# Patient Record
Sex: FEMALE | Race: WHITE | Employment: FULL TIME | URBAN - METROPOLITAN AREA
[De-identification: names, ages, dates, MRNs, and addresses within clinical notes are randomized per-mention and may not be internally consistent; named-entity substitution may affect disease eponyms.]

---

## 2017-05-04 ENCOUNTER — TELEPHONE (OUTPATIENT)
Dept: PEDIATRICS | Facility: CLINIC | Age: 22
End: 2017-05-04

## 2017-05-04 NOTE — TELEPHONE ENCOUNTER
Patient is out of [place] requesting the immunization records be fax to [phone] this is needed for school  Thank you

## 2017-07-05 ENCOUNTER — OFFICE VISIT (OUTPATIENT)
Dept: PEDIATRICS | Facility: CLINIC | Age: 22
End: 2017-07-05
Payer: COMMERCIAL

## 2017-07-05 VITALS
HEIGHT: 62 IN | DIASTOLIC BLOOD PRESSURE: 73 MMHG | SYSTOLIC BLOOD PRESSURE: 104 MMHG | OXYGEN SATURATION: 98 % | WEIGHT: 116 LBS | HEART RATE: 75 BPM | TEMPERATURE: 97.3 F | BODY MASS INDEX: 21.35 KG/M2

## 2017-07-05 DIAGNOSIS — N89.8 VAGINAL ODOR: ICD-10-CM

## 2017-07-05 DIAGNOSIS — N76.0 BACTERIAL VAGINOSIS: ICD-10-CM

## 2017-07-05 DIAGNOSIS — B96.89 BACTERIAL VAGINOSIS: ICD-10-CM

## 2017-07-05 DIAGNOSIS — R07.0 THROAT PAIN: Primary | ICD-10-CM

## 2017-07-05 DIAGNOSIS — J03.90 EXUDATIVE TONSILLITIS: ICD-10-CM

## 2017-07-05 LAB
MICRO REPORT STATUS: ABNORMAL
SPECIMEN SOURCE: ABNORMAL
WET PREP SPEC: ABNORMAL

## 2017-07-05 PROCEDURE — 99213 OFFICE O/P EST LOW 20 MIN: CPT | Performed by: NURSE PRACTITIONER

## 2017-07-05 PROCEDURE — 87210 SMEAR WET MOUNT SALINE/INK: CPT | Performed by: NURSE PRACTITIONER

## 2017-07-05 RX ORDER — CLINDAMYCIN HCL 150 MG
150 CAPSULE ORAL 3 TIMES DAILY
Qty: 30 CAPSULE | Refills: 0 | Status: SHIPPED | OUTPATIENT
Start: 2017-07-05 | End: 2018-07-24

## 2017-07-05 NOTE — PATIENT INSTRUCTIONS
[facility]- Pediatric Department    If you have any questions regarding to your visit please contact:   Team Justin:   Clinic Hours Telephone Number   MIGEL Quiros, NIRAV Sterling PA-C, MARY Davis,    7am - 7pm Mon - Thurs  7am - 5pm Fri [phone]    After hours and weekends, call [phone]   To make an appointment at any location anytime, please call [phone]  [facility].   Pediatric Walk-in Clinic* 8:30am - 3pm  Mon- Fri    [facility]   8:00am - 7pm  Mon- Thurs  8:00am - 5:30 pm Friday  9am - 1pm Saturday [phone]   Urgent [facility] - [place]      Urgent [facility] - [place]       11pm-9pm Monday - Friday   9am-5pm Saturday - Sunday    5pm-9pm Monday - Friday  9am-5pm Saturday - Sunday [phone] - [place]      [phone] - [place]   *Pediatric Walk-In Clinic is available for children/adolescents age 0-21 for the following symptoms:  Cough/Cold symptoms   Rashes/Itchy Skin  Sore throat    Urinary tract infection  Diarrhea    Ringworm  Ear pain    Sinus infection  Fever     Pink eye       If your provider has ordered a CT, MRI, or ultrasound for you, please call to schedule:  [facility], phone [phone], fax [phone]  [facility] radiology, [phone]    If you need a medication refill please contact your pharmacy.   Please allow 3 business days for your refills to be completed.  **For ADHD medication, patient will need a follow up clinic or Evisit at least every 3 months to obtain refills.**    Use [employer] (secure email communication and access to your chart) to send your primary care provider a message or make an appointment.  Ask someone on your Team how to sign up for [employer] or call the Appography help line at [phone]  To view your child's test results online: Log into your own [employer] account, select your  "child's name from the tabs on the right hand side, select \"My medical record\" and select \"Test results\"  Do you have options for a visit without coming into the clinic?  [facility] offers electronic visits (E-visits) and telephone visits for certain medical concerns as well as [employer] online.    E-visits via [employer]- generally incur a $35.00 fee.   Telephone visits- These are billed based on time spent (in 10-minute increments) on the phone with your provider.   5-10 minutes $30.00 fee   11-20 minutes $59.00 fee   21-30 minutes $85.00 fee  Zipnosis- $25.00 fee.  More information and link available on [email or web address] homepage.       Clindamycin capsules  What is this medicine?  CLINDAMYCIN (KLIN da MYCASTRO sin) is a lincosamide antibiotic. It is used to treat certain kinds of bacterial infections. It will not work for colds, flu, or other viral infections.  How should I use this medicine?  Take this medicine by mouth with a full glass of water. Follow the directions on the prescription label. You can take this medicine with food or on an empty stomach. If the medicine upsets your stomach, take it with food. Take your medicine at regular intervals. Do not take your medicine more often than directed. Take all of your medicine as directed even if you think your are better. Do not skip doses or stop your medicine early.  Talk to your pediatrician regarding the use of this medicine in children. Special care may be needed.  What side effects may I notice from receiving this medicine?  Side effects that you should report to your doctor or health care professional as soon as possible:    allergic reactions like skin rash, itching or hives, swelling of the face, lips, or tongue    dark urine    pain on swallowing    redness, blistering, peeling or loosening of the skin, including inside the mouth    unusual bleeding or bruising    unusually weak or tired    yellowing of eyes or skin  Side effects that usually do not require medical " attention (report to your doctor or health care professional if they continue or are bothersome):    diarrhea    itching in the rectal or genital area    joint pain    nausea, vomiting    stomach pain  What may interact with this medicine?    birth control pills    chloramphenicol    erythromycin    kaolin products  What if I miss a dose?  If you miss a dose, take it as soon as you can. If it is almost time for your next dose, take only that dose. Do not take double or extra doses.  Where should I keep my medicine?  Keep out of the reach of children.  Store at room temperature between 20 and 25 degrees C (68 and 77 degrees F). Throw away any unused medicine after the expiration date.  What should I tell my health care provider before I take this medicine?  They need to know if you have any of these conditions:    kidney disease    liver disease    stomach problems like colitis    an unusual or allergic reaction to clindamycin, lincomycin, or other medicines, foods, dyes like tartrazine or preservatives    pregnant or trying to get pregnant    breast-feeding  What should I watch for while using this medicine?  Tell your doctor or healthcare professional if your symptoms do not start to get better or if they get worse.  Do not treat diarrhea with over the counter products. Contact your doctor if you have diarrhea that lasts more than 2 days or if it is severe and watery.  NOTE:This sheet is a summary. It may not cover all possible information. If you have questions about this medicine, talk to your doctor, pharmacist, or health care provider. Copyright  2017 [employer]        Bacterial Vaginosis    You have a vaginal infection called bacterial vaginosis (BV). Both good and bad bacteria are present in a healthy vagina. BV occurs when these bacteria get out of balance. The number of bad bacteria increase. And the number of good bacteria decrease.  BV may or may not cause symptoms. If symptoms do occur, they can  include:    Thin, gray, milky-white, or sometimes green discharge    Unpleasant odor or  fishy  smell    Itching, burning, or pain in or around the vagina  It is not known what causes BV, but certain factors can make the problem more likely. This can include:    Douching    Having sex with a new partner    Having sex with more than one partner  BV will sometimes go away on its own. But treatment is usually recommended. This is because untreated BV can increase the risk of more serious health problems such as:    Pelvic inflammatory disease (PID)     delivery (giving birth to a baby early if you re pregnant)    HIV and certain other sexually transmitted diseases (STDs)    Infection after surgery on the reproductive organs  Home care  General care    BV is most often treated with medicines called antibiotics. These may be given as pills or as a vaginal cream. If antibiotics are prescribed, be sure to use them exactly as directed. Also, be sure to complete all of the medicine, even if your symptoms go away.    Avoid douching or having sex during treatment.    If you have sex with a female partner, ask your healthcare provider if she should also be treated.  Prevention    Limit or avoid douching.    Avoid having sex. If you do have sex, then take steps to lower your risk:    Use condoms when having sex.    Limit the number of partners you have sex with.  Follow-up care  Follow up with your healthcare provider, or as advised.  When to seek medical advice  Call your healthcare provider right away if:    You have a fever of 100.4 F (38 C) or higher, or as directed by your provider.    Your symptoms worsen, or they don t go away within a few days of starting treatment.    You have new pain in the lower belly or pelvic region.    You have side effects that bother you or a reaction to the pills or cream you re prescribed.    You or any partners you have sex with have new symptoms, such as a rash, joint pain, or  sores.  Date Last Reviewed: 7/30/2015 2000-2017 [employer]. [address] [place] [address], [place] [address]. All rights reserved. This information is not intended as a substitute for professional medical care. Always follow your healthcare professional's instructions.

## 2017-07-05 NOTE — PROGRESS NOTES
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  For the past week her left tonsil has been really painful.  The lymph node on the left side is swollen and sore. Her left ear is sore too.  She thinks she did have a fever last week, she felt clammy and sweaty..    She was swabbed at work yesterday and her RST was negative.      She is off all birth control and is not currently sexually active.  She has tried every type of birth control and had an IUD placed and on this she had her period for 3 months and this was removed.  She was having migraines for her birth control once she stopped the birth control her migraines stopped.      She is not taking any meds for her depression and is working out and feeling well.      She was tested for chlamydia and gonorrhea at her gynecologist on 10/16/16 and was negative.      ROS:  GENERAL: As in HPI  SKIN: Rash - No; Hives - No; Eczema - No;  EYE: Pain - No; Discharge - No; Redness - No; Itching - No; Vision Problems - No;  ENT: Ear Pain - YES; Runny nose - No; Congestion - No; Sore Throat - YES;  RESP: Cough - No; Wheezing - No; Difficulty Breathing - No;  GI: Vomiting - No; Diarrhea - No; Abdominal Pain - No; Constipation - No;  NEURO: Headache - No; Weakness - No;    PROBLEM LIST:  Patient Active Problem List    Diagnosis Date Noted     Family history of high cholesterol 12/09/2015     Priority: Medium     Sleeping difficulties 02/26/2015     Priority: Medium     Anemia 02/03/2015     Priority: Medium     Nevus 02/02/2015     Priority: Medium     Do  "you wish to do the replacement in the background? yes         Anxiety 10/20/2014     Priority: Medium     Major depressive disorder, recurrent episode, severe (H) 10/20/2014     Priority: Medium     Problem list name updated by automated process. Provider to review       Hyperlipidemia LDL goal <160 07/08/2014     Priority: Medium     Menorrhagia 09/26/2011     Priority: Medium      MEDICATIONS:  Current Outpatient Prescriptions   Medication Sig Dispense Refill     SUMAtriptan (IMITREX) 25 MG tablet Take 1-2 tablets (25-50 mg) by mouth at onset of headache for migraine May repeat in 2 hours. Max 8 tablets/24 hours. 18 tablet 1     metroNIDAZOLE (METROCREAM) 0.75 % cream Apply topically 2 times daily 45 g 11      ALLERGIES:  No Known Allergies    Problem list and histories reviewed & adjusted, as indicated.    OBJECTIVE:                                                      /73  Pulse 75  Temp 97.3  F (36.3  C) (Oral)  Ht 5' 1.5\" (1.562 m)  Wt 116 lb (52.6 kg)  LMP 06/25/2017  SpO2 98%  BMI 21.56 kg/m2   Normalized stature-for-age data not available for patients older than 20 years.    GENERAL: Active, alert, in no acute distress.  SKIN: Clear. No significant rash, abnormal pigmentation or lesions  HEAD: Normocephalic.  EYES:  No discharge or erythema. Normal pupils and EOM.  EARS: Normal canals. Tympanic membranes are normal; gray and translucent.  NOSE: Normal without discharge.  MOUTH/THROAT: mild erythema on the oropharynx, tonsillar exudates present (left) and tonsillar hypertrophy, left side 3 +  NECK: Supple, no masses.  LYMPH NODES: No adenopathy  LUNGS: Clear. No rales, rhonchi, wheezing or retractions  HEART: Regular rhythm. Normal S1/S2. No murmurs.      DIAGNOSTICS:   Results for orders placed or performed in visit on 07/05/17 (from the past 24 hour(s))   Wet prep   Result Value Ref Range    Specimen Description Vagina     Wet Prep (A)      Clue cells seen  No Trichomonas seen  No yeast seen   "    Micro Report Status FINAL 07/05/2017        ASSESSMENT/PLAN:                                                    (R07.0) Throat pain  (primary encounter diagnosis)  (J03.90) Exudative tonsillitis  Comment:   Plan: clindamycin (CLEOCIN) 150 MG capsule        Antibiotics per Epic order.  Symptomatic treat with gargles, lozenges, and OTC analgesic as needed.  Is contagious until on antibiotics for 24 hours, limit contacts and no school until tomorrow afternoon.  Discard toothbrush after 24 hours on antibiotics and then at the end of therapy.  Do not share food or drinks for the next 24 hours.  Follow-up with primary care provider if not improving.      (N89.8) Vaginal odor  Comment:   Plan: Wet prep        Positive for clue cells    (N76.0,  B96.89) Bacterial vaginosis  Comment:   Plan: clindamycin (CLEOCIN) 150 MG capsule  Discussed with Joyce and the Clindamycin will cover this.  Follow up if not improving as expected.    FOLLOW UP: If not improving or if worsening    Leta Ayala, PNP, APRN CNP

## 2017-07-05 NOTE — NURSING NOTE
"Chief Complaint   Patient presents with     Vaginal Problem     poss yeast infection     Pharyngitis     tonsil hurts only on left x1week       Initial /73  Pulse 75  Temp 97.3  F (36.3  C) (Oral)  Ht 5' 1.5\" (1.562 m)  Wt 116 lb (52.6 kg)  SpO2 98%  BMI 21.56 kg/m2 Estimated body mass index is 21.56 kg/(m^2) as calculated from the following:    Height as of this encounter: 5' 1.5\" (1.562 m).    Weight as of this encounter: 116 lb (52.6 kg).  Medication Reconciliation: complete    Ann Nath MA  "

## 2017-07-05 NOTE — MR AVS SNAPSHOT
After Visit Summary   7/5/2017    Joyce Aquino    MRN: [id number]           Patient Information     Date Of Birth          1995        Visit Information        Provider Department      7/5/2017 8:10 AM Leta Ayala APRN [facility]        Today's Diagnoses     Vaginal odor    -  1    Throat pain        Exudative tonsillitis        Bacterial vaginosis          Care Instructions    [facility]- Pediatric Department    If you have any questions regarding to your visit please contact:   Team Justin:   Clinic Hours Telephone Number   MIGEL Quiros, CPNP  Yin Sterling PA-C, MS    Natacha Powers, RN  Vanessa Hancock,    7am - 7pm Mon - Thurs  7am - 5pm Fri [phone]    After hours and weekends, call [phone]   To make an appointment at any location anytime, please call [phone] or  [email or web address].   Pediatric Walk-in Clinic* 8:30am - 3pm  Mon- Fri    [facility]   8:00am - 7pm  Mon- Thurs  8:00am - 5:30 pm Friday  9am - 1pm Saturday [phone]   Urgent Care - [place]      Urgent [facility] - [place]       11pm-9pm Monday - Friday   9am-5pm Saturday - Sunday    5pm-9pm Monday - Friday  9am-5pm Saturday - Sunday [phone] - [place]      [phone] - [place]   *Pediatric Walk-In Clinic is available for children/adolescents age 0-21 for the following symptoms:  Cough/Cold symptoms   Rashes/Itchy Skin  Sore throat    Urinary tract infection  Diarrhea    Ringworm  Ear pain    Sinus infection  Fever     Pink eye       If your provider has ordered a CT, MRI, or ultrasound for you, please call to schedule:  Jb ng, phone [phone], fax [phone]  [facility] radiology, [phone]    If you need a medication refill please contact your pharmacy.   Please allow 3 business days for your refills to be completed.  **For  "ADHD medication, patient will need a follow up clinic or Evisit at least every 3 months to obtain refills.**    Use [employer] (secure email communication and access to your chart) to send your primary care provider a message or make an appointment.  Ask someone on your Team how to sign up for "[employer] or call the "[employer] help line at [phone]  To view your child's test results online: Log into your own "[employer] account, select your child's name from the tabs on the right hand side, select \"My medical record\" and select \"Test results\"  Do you have options for a visit without coming into the clinic?  [employer] offers electronic visits (E-visits) and telephone visits for certain medical concerns as well as Zipnosis online.    E-visits via "[employer]- generally incur a $35.00 fee.   Telephone visits- These are billed based on time spent (in 10-minute increments) on the phone with your provider.   5-10 minutes $30.00 fee   11-20 minutes $59.00 fee   21-30 minutes $85.00 fee  Zipnosis- $25.00 fee.  More information and link available on [employer] homepage.       Clindamycin capsules  What is this medicine?  CLINDAMYCIN (KLIN da MYE sin) is a lincosamide antibiotic. It is used to treat certain kinds of bacterial infections. It will not work for colds, flu, or other viral infections.  How should I use this medicine?  Take this medicine by mouth with a full glass of water. Follow the directions on the prescription label. You can take this medicine with food or on an empty stomach. If the medicine upsets your stomach, take it with food. Take your medicine at regular intervals. Do not take your medicine more often than directed. Take all of your medicine as directed even if you think your are better. Do not skip doses or stop your medicine early.  Talk to your pediatrician regarding the use of this medicine in children. Special care may be needed.  What side effects may I notice from receiving this medicine?  Side effects " that you should report to your doctor or health care professional as soon as possible:    allergic reactions like skin rash, itching or hives, swelling of the face, lips, or tongue    dark urine    pain on swallowing    redness, blistering, peeling or loosening of the skin, including inside the mouth    unusual bleeding or bruising    unusually weak or tired    yellowing of eyes or skin  Side effects that usually do not require medical attention (report to your doctor or health care professional if they continue or are bothersome):    diarrhea    itching in the rectal or genital area    joint pain    nausea, vomiting    stomach pain  What may interact with this medicine?    birth control pills    chloramphenicol    erythromycin    kaolin products  What if I miss a dose?  If you miss a dose, take it as soon as you can. If it is almost time for your next dose, take only that dose. Do not take double or extra doses.  Where should I keep my medicine?  Keep out of the reach of children.  Store at room temperature between 20 and 25 degrees C (68 and 77 degrees F). Throw away any unused medicine after the expiration date.  What should I tell my health care provider before I take this medicine?  They need to know if you have any of these conditions:    kidney disease    liver disease    stomach problems like colitis    an unusual or allergic reaction to clindamycin, lincomycin, or other medicines, foods, dyes like tartrazine or preservatives    pregnant or trying to get pregnant    breast-feeding  What should I watch for while using this medicine?  Tell your doctor or healthcare professional if your symptoms do not start to get better or if they get worse.  Do not treat diarrhea with over the counter products. Contact your doctor if you have diarrhea that lasts more than 2 days or if it is severe and watery.  NOTE:This sheet is a summary. It may not cover all possible information. If you have questions about this medicine,  talk to your doctor, pharmacist, or health care provider. Copyright  2017 [employer] Standard        Bacterial Vaginosis    You have a vaginal infection called bacterial vaginosis (BV). Both good and bad bacteria are present in a healthy vagina. BV occurs when these bacteria get out of balance. The number of bad bacteria increase. And the number of good bacteria decrease.  BV may or may not cause symptoms. If symptoms do occur, they can include:    Thin, gray, milky-white, or sometimes green discharge    Unpleasant odor or  fishy  smell    Itching, burning, or pain in or around the vagina  It is not known what causes BV, but certain factors can make the problem more likely. This can include:    Douching    Having sex with a new partner    Having sex with more than one partner  BV will sometimes go away on its own. But treatment is usually recommended. This is because untreated BV can increase the risk of more serious health problems such as:    Pelvic inflammatory disease (PID)     delivery (giving birth to a baby early if you re pregnant)    HIV and certain other sexually transmitted diseases (STDs)    Infection after surgery on the reproductive organs  Home care  General care    BV is most often treated with medicines called antibiotics. These may be given as pills or as a vaginal cream. If antibiotics are prescribed, be sure to use them exactly as directed. Also, be sure to complete all of the medicine, even if your symptoms go away.    Avoid douching or having sex during treatment.    If you have sex with a female partner, ask your healthcare provider if she should also be treated.  Prevention    Limit or avoid douching.    Avoid having sex. If you do have sex, then take steps to lower your risk:    Use condoms when having sex.    Limit the number of partners you have sex with.  Follow-up care  Follow up with your healthcare provider, or as advised.  When to seek medical advice  Call your healthcare provider  right away if:    You have a fever of 100.4 F (38 C) or higher, or as directed by your provider.    Your symptoms worsen, or they don t go away within a few days of starting treatment.    You have new pain in the lower belly or pelvic region.    You have side effects that bother you or a reaction to the pills or cream you re prescribed.    You or any partners you have sex with have new symptoms, such as a rash, joint pain, or sores.  Date Last Reviewed: 7/30/2015 2000-2017 [employer]. [address] [place] [address]. All rights reserved. This information is not intended as a substitute for professional medical care. Always follow your healthcare professional's instructions.                Follow-ups after your visit        Your next 10 appointments already scheduled     Jul 06, 2017  3:45 PM CDT   New Visit with Yeimi Laureano PA-C   [facility]    [address] [place] [id number] [phone]              Who to contact     If you have questions or need follow up information about today's clinic visit or your schedule please contact [facility] directly at [phone].  Normal or non-critical lab and imaging results will be communicated to you by MyChart, letter or phone within 4 business days after the clinic has received the results. If you do not hear from us within 7 days, please contact the clinic through MyChart or phone. If you have a critical or abnormal lab result, we will notify you by phone as soon as possible.  Submit refill requests through [employer] or call your pharmacy and they will forward the refill request to us. Please allow 3 business days for your refill to be completed.          Additional Information About Your Visit        IdenIveharCareParent Information     AIS gives you secure access to your electronic health record. If you see a primary care provider, you can also send messages to your care team  "and make appointments. If you have questions, please call your primary care clinic.  If you do not have a primary care provider, please call [phone] and they will assist you.        Care EveryWhere ID     This is your Care EveryWhere ID. This could be used by other organizations to access your [place] medical records  [id number]        Your Vitals Were     Pulse Temperature Height Last Period Pulse Oximetry BMI (Body Mass Index)    75 97.3  F (36.3  C) (Oral) 5' 1.5\" (1.562 m) 06/25/2017 98% 21.56 kg/m2       Blood Pressure from Last 3 Encounters:   07/05/17 104/73   08/19/16 113/65   07/27/16 120/70    Weight from Last 3 Encounters:   07/05/17 116 lb (52.6 kg)   07/27/16 118 lb (53.5 kg)   06/09/16 113 lb (51.3 kg)              We Performed the Following     Wet prep          Today's Medication Changes          These changes are accurate as of: 7/5/17  9:24 AM.  If you have any questions, ask your nurse or doctor.               Start taking these medicines.        Dose/Directions    clindamycin 150 MG capsule   Commonly known as:  CLEOCIN   Used for:  Exudative tonsillitis, Bacterial vaginosis   Started by:  Leta Ayala APRN CNP        Dose:  150 mg   Take 1 capsule (150 mg) by mouth 3 times daily   Quantity:  30 capsule   Refills:  0            Where to get your medicines      These medications were sent to [place] [address] [place] [facility] [address] [facility]  [address] [facility] [address]     Phone:  [phone]     clindamycin 150 MG capsule                Primary Care Provider Office Phone # Fax #    [facility] [phone]       No address on file        Equal Access to Services     AVE FARLEY : Sy Senior, ling lund, zack cuba. [facility] [phone].    ATENCIÓN: Si habla español, tiene a concepcion disposición servicios gratuitos de asistencia lingüística. Llame al " [phone].    We comply with applicable federal civil rights laws and [place] laws. We do not discriminate on the basis of race, color, national origin, age, disability sex, sexual orientation or gender identity.            Thank you!     Thank you for choosing [facility]  for your care. Our goal is always to provide you with excellent care. Hearing back from our patients is one way we can continue to improve our services. Please take a few minutes to complete the written survey that you may receive in the mail after your visit with us. Thank you!             Your Updated Medication List - Protect others around you: Learn how to safely use, store and throw away your medicines at www.disposemymeds.org.          This list is accurate as of: 7/5/17  9:24 AM.  Always use your most recent med list.                   Brand Name Dispense Instructions for use Diagnosis    clindamycin 150 MG capsule    CLEOCIN    30 capsule    Take 1 capsule (150 mg) by mouth 3 times daily    Exudative tonsillitis, Bacterial vaginosis       metroNIDAZOLE 0.75 % cream    METROCREAM    45 g    Apply topically 2 times daily    Dermatitis, perioral       SUMAtriptan 25 MG tablet    IMITREX    18 tablet    Take 1-2 tablets (25-50 mg) by mouth at onset of headache for migraine May repeat in 2 hours. Max 8 tablets/24 hours.    Other migraine without status migrainosus, not intractable

## 2017-07-05 NOTE — Clinical Note
Please abstract the following data from this visit with this patient into the appropriate field in Epic:  She was tested for chlamydia and gonorrhea at her gynecologist on 10/16/16 and was negative.  She was tested for chlamydia and gonorrhea at her gynecologist on 10/16/16 and was negative.

## 2017-07-06 ENCOUNTER — OFFICE VISIT (OUTPATIENT)
Dept: DERMATOLOGY | Facility: CLINIC | Age: 22
End: 2017-07-06
Payer: COMMERCIAL

## 2017-07-06 VITALS — DIASTOLIC BLOOD PRESSURE: 60 MMHG | OXYGEN SATURATION: 97 % | SYSTOLIC BLOOD PRESSURE: 93 MMHG | HEART RATE: 77 BPM

## 2017-07-06 DIAGNOSIS — L21.9 DERMATITIS, SEBORRHEIC: Primary | ICD-10-CM

## 2017-07-06 PROCEDURE — 99212 OFFICE O/P EST SF 10 MIN: CPT | Performed by: PHYSICIAN ASSISTANT

## 2017-07-06 RX ORDER — KETOCONAZOLE 20 MG/ML
SHAMPOO TOPICAL
Qty: 120 ML | Refills: 11 | Status: SHIPPED | OUTPATIENT
Start: 2017-07-06 | End: 2018-11-02

## 2017-07-06 NOTE — PROGRESS NOTES
HPI:   Joyce Aquino is a 22 year old female who presents for evaluation of itchy, flaky scalp  chief complaint  Location: scalp   Condition present for:  A few months - has had increased itching, flaking and tiny pimples. She does scratch head frequently.    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 Mood appropriate  Alert and Oriented X 3. Well developed, well nourished in no distress.  General appearance: Normal  Head including face: Normal  Eyes: conjunctiva and lids: Normal  Mouth: Lips, teeth, gums: Normal  Neck: Normal  Chest-breast/axillae: Normal  Back: Normal  Spleen and liver: Normal  Cardiovascular: Exam of peripheral vascular system by observation for swelling, varicosities, edema: Normal  Genitalia: groin, buttocks: Normal  Extremities: digits/nails (clubbing): Normal  Eccrine and Apocrine glands: Normal  Right upper extremity: Normal  Left upper extremity: Normal  Right lower extremity: Normal  Left lower extremity: Normal  Skin: Scalp and body hair: See below    1. Flaking and erythema on scalp    ASSESSMENT/PLAN:     Seborrheic dermatitis - advised on chronic, recurrent condition. Discussed that it is a reaction to the normal yeast on the skin. Has tried OTC shampoos in the past.   --Start ketoconazole shampoo daily as needed  --Consider Lidex if struggling          Follow-up: PRN  CC:   Scribed By: Yeimi Laureano, MS, PA-C

## 2017-07-06 NOTE — MR AVS SNAPSHOT
After Visit Summary   7/6/2017    Joyce Aquino    MRN: [id number]           Patient Information     Date Of Birth          1995        Visit Information        Provider Department      7/6/2017 3:45 PM Yeimi Laureano PA-C [facility]        Today's Diagnoses     Dermatitis, seborrheic    -  1       Follow-ups after your visit        Who to contact     If you have questions or need follow up information about today's clinic visit or your schedule please contact [facility] directly at [phone].  Normal or non-critical lab and imaging results will be communicated to you by [employer], letter or phone within 4 business days after the clinic has received the results. If you do not hear from us within 7 days, please contact the clinic through [employer] or phone. If you have a critical or abnormal lab result, we will notify you by phone as soon as possible.  Submit refill requests through [email or web address] or call your pharmacy and they will forward the refill request to us. Please allow 3 business days for your refill to be completed.          Additional Information About Your Visit        MyChart Information     [email or web address] gives you secure access to your electronic health record. If you see a primary care provider, you can also send messages to your care team and make appointments. If you have questions, please call your primary care clinic.  If you do not have a primary care provider, please call [phone] and they will assist you.        Care EveryWhere ID     This is your Care EveryWhere ID. This could be used by other organizations to access your [place] medical records  [id number]        Your Vitals Were     Pulse Last Period Pulse Oximetry             77 06/25/2017 97%          Blood Pressure from Last 3 Encounters:   07/06/17 93/60   07/05/17 104/73   08/19/16 113/65    Weight from Last 3 Encounters:   07/05/17 52.6 kg (116 lb)   07/27/16 53.5 kg (118 lb)   06/09/16 51.3  kg (113 lb)              Today, you had the following     No orders found for display         Today's Medication Changes          These changes are accurate as of: 7/6/17  4:48 PM.  If you have any questions, ask your nurse or doctor.               Start taking these medicines.        Dose/Directions    ketoconazole 2 % shampoo   Commonly known as:  NIZORAL   Used for:  Dermatitis, seborrheic   Started by:  Yeimi Laureano PA-C        Use daily as needed   Quantity:  120 mL   Refills:  11            Where to get your medicines      These medications were sent to [place] [address] [place] [facility] [place] [place] - [address] [facility]  [address] [facility] [address]     Phone:  [phone]     ketoconazole 2 % shampoo                Primary Care Provider Office Phone # Fax #    [facility] [phone]       No address on file        Equal Access to Services     AVE FARLEY : Hadii aad ku hadasho Soadilene, waaxda luqadaha, qaybta kaalmada adebarronyada, zack russ . [facility] [phone].    ATENCIÓN: Si habla español, tiene a concepcion disposición servicios gratuitos de asistencia lingüística. Llame al [phone].    We comply with applicable federal civil rights laws and [place] laws. We do not discriminate on the basis of race, color, national origin, age, disability sex, sexual orientation or gender identity.            Thank you!     Thank you for choosing [facility]  for your care. Our goal is always to provide you with excellent care. Hearing back from our patients is one way we can continue to improve our services. Please take a few minutes to complete the written survey that you may receive in the mail after your visit with us. Thank you!             Your Updated Medication List - Protect others around you: Learn how to safely use, store and throw away your medicines at www.disposemymeds.org.          This list is accurate as of: 7/6/17  4:48 PM.   Always use your most recent med list.                   Brand Name Dispense Instructions for use Diagnosis    clindamycin 150 MG capsule    CLEOCIN    30 capsule    Take 1 capsule (150 mg) by mouth 3 times daily    Exudative tonsillitis, Bacterial vaginosis       ketoconazole 2 % shampoo    NIZORAL    120 mL    Use daily as needed    Dermatitis, seborrheic       metroNIDAZOLE 0.75 % cream    METROCREAM    45 g    Apply topically 2 times daily    Dermatitis, perioral       SUMAtriptan 25 MG tablet    IMITREX    18 tablet    Take 1-2 tablets (25-50 mg) by mouth at onset of headache for migraine May repeat in 2 hours. Max 8 tablets/24 hours.    Other migraine without status migrainosus, not intractable

## 2017-09-17 ENCOUNTER — HEALTH MAINTENANCE LETTER (OUTPATIENT)
Age: 22
End: 2017-09-17

## 2017-11-22 ENCOUNTER — OFFICE VISIT (OUTPATIENT)
Dept: PEDIATRICS | Facility: CLINIC | Age: 22
End: 2017-11-22
Payer: COMMERCIAL

## 2017-11-22 VITALS
WEIGHT: 113 LBS | HEIGHT: 62 IN | OXYGEN SATURATION: 100 % | HEART RATE: 58 BPM | SYSTOLIC BLOOD PRESSURE: 102 MMHG | DIASTOLIC BLOOD PRESSURE: 67 MMHG | BODY MASS INDEX: 20.8 KG/M2 | TEMPERATURE: 96.7 F

## 2017-11-22 DIAGNOSIS — Z23 NEED FOR VACCINATION: ICD-10-CM

## 2017-11-22 DIAGNOSIS — G43.809 OTHER MIGRAINE WITHOUT STATUS MIGRAINOSUS, NOT INTRACTABLE: ICD-10-CM

## 2017-11-22 DIAGNOSIS — Z83.42 FAMILY HISTORY OF HIGH CHOLESTEROL: ICD-10-CM

## 2017-11-22 DIAGNOSIS — D50.8 OTHER IRON DEFICIENCY ANEMIA: Primary | ICD-10-CM

## 2017-11-22 DIAGNOSIS — Z13.9 SCREENING FOR CONDITION: ICD-10-CM

## 2017-11-22 LAB
BASOPHILS # BLD AUTO: 0 10E9/L (ref 0–0.2)
BASOPHILS NFR BLD AUTO: 0.3 %
CHOLEST SERPL-MCNC: 120 MG/DL
DIFFERENTIAL METHOD BLD: NORMAL
EOSINOPHIL # BLD AUTO: 0.1 10E9/L (ref 0–0.7)
EOSINOPHIL NFR BLD AUTO: 1.5 %
ERYTHROCYTE [DISTWIDTH] IN BLOOD BY AUTOMATED COUNT: 13.5 % (ref 10–15)
FERRITIN SERPL-MCNC: 16 NG/ML (ref 12–150)
GLUCOSE SERPL-MCNC: 76 MG/DL (ref 70–99)
HBA1C MFR BLD: 5 % (ref 4.3–6)
HCT VFR BLD AUTO: 44.6 % (ref 35–47)
HDLC SERPL-MCNC: 59 MG/DL
HGB BLD-MCNC: 14.9 G/DL (ref 11.7–15.7)
IRON SATN MFR SERPL: 31 % (ref 15–46)
IRON SERPL-MCNC: 135 UG/DL (ref 35–180)
LDLC SERPL CALC-MCNC: 48 MG/DL
LYMPHOCYTES # BLD AUTO: 1.8 10E9/L (ref 0.8–5.3)
LYMPHOCYTES NFR BLD AUTO: 30.3 %
MCH RBC QN AUTO: 29.6 PG (ref 26.5–33)
MCHC RBC AUTO-ENTMCNC: 33.4 G/DL (ref 31.5–36.5)
MCV RBC AUTO: 89 FL (ref 78–100)
MONOCYTES # BLD AUTO: 0.3 10E9/L (ref 0–1.3)
MONOCYTES NFR BLD AUTO: 5.1 %
NEUTROPHILS # BLD AUTO: 3.7 10E9/L (ref 1.6–8.3)
NEUTROPHILS NFR BLD AUTO: 62.8 %
NONHDLC SERPL-MCNC: 61 MG/DL
PLATELET # BLD AUTO: 192 10E9/L (ref 150–450)
RBC # BLD AUTO: 5.03 10E12/L (ref 3.8–5.2)
T4 FREE SERPL-MCNC: 0.75 NG/DL (ref 0.76–1.46)
TIBC SERPL-MCNC: 429 UG/DL (ref 240–430)
TRIGL SERPL-MCNC: 63 MG/DL
TSH SERPL DL<=0.005 MIU/L-ACNC: 1.43 MU/L (ref 0.4–4)
WBC # BLD AUTO: 5.8 10E9/L (ref 4–11)

## 2017-11-22 PROCEDURE — 36415 COLL VENOUS BLD VENIPUNCTURE: CPT | Performed by: NURSE PRACTITIONER

## 2017-11-22 PROCEDURE — 85025 COMPLETE CBC W/AUTO DIFF WBC: CPT | Performed by: NURSE PRACTITIONER

## 2017-11-22 PROCEDURE — 84439 ASSAY OF FREE THYROXINE: CPT | Performed by: NURSE PRACTITIONER

## 2017-11-22 PROCEDURE — 84443 ASSAY THYROID STIM HORMONE: CPT | Performed by: NURSE PRACTITIONER

## 2017-11-22 PROCEDURE — 80061 LIPID PANEL: CPT | Performed by: NURSE PRACTITIONER

## 2017-11-22 PROCEDURE — 83550 IRON BINDING TEST: CPT | Performed by: NURSE PRACTITIONER

## 2017-11-22 PROCEDURE — 82947 ASSAY GLUCOSE BLOOD QUANT: CPT | Performed by: NURSE PRACTITIONER

## 2017-11-22 PROCEDURE — 82728 ASSAY OF FERRITIN: CPT | Performed by: NURSE PRACTITIONER

## 2017-11-22 PROCEDURE — 90714 TD VACC NO PRESV 7 YRS+ IM: CPT | Performed by: NURSE PRACTITIONER

## 2017-11-22 PROCEDURE — 99213 OFFICE O/P EST LOW 20 MIN: CPT | Performed by: NURSE PRACTITIONER

## 2017-11-22 PROCEDURE — 83540 ASSAY OF IRON: CPT | Performed by: NURSE PRACTITIONER

## 2017-11-22 PROCEDURE — 83036 HEMOGLOBIN GLYCOSYLATED A1C: CPT | Performed by: NURSE PRACTITIONER

## 2017-11-22 RX ORDER — SUMATRIPTAN 25 MG/1
25-50 TABLET, FILM COATED ORAL
Qty: 18 TABLET | Refills: 1 | Status: SHIPPED | OUTPATIENT
Start: 2017-11-22 | End: 2018-11-02

## 2017-11-22 NOTE — MR AVS SNAPSHOT
After Visit Summary   11/22/2017    Joyce Aquino    MRN: [id number]           Patient Information     Date Of Birth          1995        Visit Information        Provider Department      11/22/2017 7:30 AM Leta Ayala APRN CNP [facility]        Today's Diagnoses     Other iron deficiency anemia    -  1    Family history of high cholesterol        Other migraine without status migrainosus, not intractable        Screening for condition        Need for vaccination          Care Instructions    [facility]- Pediatric Department    If you have any questions regarding to your visit please contact:   Team Justin:   Clinic Hours Telephone Number   MIGEL Quiros, CPLUISITO Sterling PA-C, MARY Davis,    7am - 7pm Mon - Thurs  7am - 5pm Fri [phone]    After hours and weekends, call [phone]   To make an appointment at any location anytime, please call [phone] or  [email or web address].   Pediatric Walk-in Clinic* 8:30am - 3pm  Mon- Fri    [facility]   8:00am - 7pm  Mon- Thurs  8:00am - 5:30 pm Friday  9am - 1pm Saturday [phone]   Urgent Care - [place]      Urgent Care - [place]       11pm-9pm Monday - Friday   9am-5pm Saturday - Sunday    5pm-9pm Monday - Friday  9am-5pm Saturday - Sunday [phone] - [place]      [phone] - [place]   *Pediatric Walk-In Clinic is available for children/adolescents age 0-21 for the following symptoms:  Cough/Cold symptoms   Rashes/Itchy Skin  Sore throat    Urinary tract infection  Diarrhea    Ringworm  Ear pain    Sinus infection  Fever     Pink eye       If your provider has ordered a CT, MRI, or ultrasound for you, please call to schedule:  [facility], phone [phone], fax [phone]  [facility] radiology, [phone]    If you need a medication  "refill please contact your pharmacy.   Please allow 3 business days for your refills to be completed.  **For ADHD medication, patient will need a follow up clinic or Evisit at least every 3 months to obtain refills.**    Use [employer] (secure email communication and access to your chart) to send your primary care provider a message or make an appointment.  Ask someone on your Team how to sign up for [employer] or call the [employer] help line at [phone]  To view your child's test results online: Log into your own [employer] account, select your child's name from the tabs on the right hand side, select \"My medical record\" and select \"Test results\"  Do you have options for a visit without coming into the clinic?  [place] offers electronic visits (E-visits) and telephone visits for certain medical concerns as well as Zipnosis online.    E-visits via [employer]- generally incur a $35.00 fee.   Telephone visits- These are billed based on time spent (in 10-minute increments) on the phone with your provider.   5-10 minutes $30.00 fee   11-20 minutes $59.00 fee   21-30 minutes $85.00 fee  Zipnosis- $25.00 fee.  More information and link available on [email or web address] homepage.               Follow-ups after your visit        Who to contact     If you have questions or need follow up information about today's clinic visit or your schedule please contact [facility] directly at [phone].  Normal or non-critical lab and imaging results will be communicated to you by [employer], letter or phone within 4 business days after the clinic has received the results. If you do not hear from us within 7 days, please contact the clinic through [employer] or phone. If you have a critical or abnormal lab result, we will notify you by phone as soon as possible.  Submit refill requests through [employer] or call your pharmacy and they will forward the refill request to us. Please allow 3 business days for your refill to be completed.          " "Additional Information About Your Visit        MyChart Information     [employer] gives you secure access to your electronic health record. If you see a primary care provider, you can also send messages to your care team and make appointments. If you have questions, please call your primary care clinic.  If you do not have a primary care provider, please call [phone] and they will assist you.        Care EveryWhere ID     This is your Care EveryWhere ID. This could be used by other organizations to access your [place] medical records  [id number]        Your Vitals Were     Pulse Temperature Height Pulse Oximetry BMI (Body Mass Index)       58 96.7  F (35.9  C) (Oral) 5' 1.75\" (1.568 m) 100% 20.84 kg/m2        Blood Pressure from Last 3 Encounters:   11/22/17 102/67   07/06/17 93/60   07/05/17 104/73    Weight from Last 3 Encounters:   11/22/17 113 lb (51.3 kg)   07/05/17 116 lb (52.6 kg)   07/27/16 118 lb (53.5 kg)              We Performed the Following     CBC with platelets differential     Ferritin     Glucose     Hemoglobin A1c     Iron and iron binding capacity     Lipid panel reflex to direct LDL Fasting     T4 free     TD PRESERV FREE >=7 YRS ADS IM     TSH          Where to get your medicines      These medications were sent to [facility] [address] [facility] [address]  [address] [address]     Phone:  [phone]     SUMAtriptan 25 MG tablet          Primary Care Provider Office Phone # Fax #    [facility] [phone]       No address on file        Equal Access to Services     [facility] : Hadii aad ku hadasho Soomaali, waaxda luqadaha, qaybta kaalmada adedanny, zack russ . So [facility] [phone].    ATENCIÓN: Si habla español, tiene a concepcion disposición servicios gratuitos de asistencia lingüística. Llame al [phone].    We comply with applicable federal civil rights laws and [place] laws. We do not " discriminate on the basis of race, color, national origin, age, disability, sex, sexual orientation, or gender identity.            Thank you!     Thank you for choosing [facility]  for your care. Our goal is always to provide you with excellent care. Hearing back from our patients is one way we can continue to improve our services. Please take a few minutes to complete the written survey that you may receive in the mail after your visit with us. Thank you!             Your Updated Medication List - Protect others around you: Learn how to safely use, store and throw away your medicines at www.disposemymeds.org.          This list is accurate as of: 11/22/17  8:30 AM.  Always use your most recent med list.                   Brand Name Dispense Instructions for use Diagnosis    clindamycin 150 MG capsule    CLEOCIN    30 capsule    Take 1 capsule (150 mg) by mouth 3 times daily    Exudative tonsillitis, Bacterial vaginosis       ketoconazole 2 % shampoo    NIZORAL    120 mL    Use daily as needed    Dermatitis, seborrheic       metroNIDAZOLE 0.75 % cream    METROCREAM    45 g    Apply topically 2 times daily    Dermatitis, perioral       SUMAtriptan 25 MG tablet    IMITREX    18 tablet    Take 1-2 tablets (25-50 mg) by mouth at onset of headache for migraine May repeat in 2 hours. Max 8 tablets/24 hours.    Other migraine without status migrainosus, not intractable

## 2017-11-22 NOTE — PATIENT INSTRUCTIONS
[facility]- Pediatric Department    If you have any questions regarding to your visit please contact:   Team Justin:   Clinic Hours Telephone Number   MIGEL Quiros, NIRAV Sterling PA-C, MARY Davis,    7am - 7pm Mon - Thurs  7am - 5pm Fri [phone]    After hours and weekends, call [phone]   To make an appointment at any location anytime, please call [phone] or  [email or web address].   Pediatric Walk-in Clinic* 8:30am - 3pm  Mon- Fri    [facility]   8:00am - 7pm  Mon- Thurs  8:00am - 5:30 pm Friday  9am - 1pm Saturday [phone]   Urgent Care - [place]      Urgent Care - [place]       11pm-9pm Monday - Friday   9am-5pm Saturday - Sunday    5pm-9pm Monday - Friday  9am-5pm Saturday - Sunday [phone] - [place]      [phone] - [place]   *Pediatric Walk-In Clinic is available for children/adolescents age 0-21 for the following symptoms:  Cough/Cold symptoms   Rashes/Itchy Skin  Sore throat    Urinary tract infection  Diarrhea    Ringworm  Ear pain    Sinus infection  Fever     Pink eye       If your provider has ordered a CT, MRI, or ultrasound for you, please call to schedule:  [facility], phone [phone], fax [phone]  [facility] radiology, [phone]    If you need a medication refill please contact your pharmacy.   Please allow 3 business days for your refills to be completed.  **For ADHD medication, patient will need a follow up clinic or Evisit at least every 3 months to obtain refills.**    Use [employer] (secure email communication and access to your chart) to send your primary care provider a message or make an appointment.  Ask someone on your Team how to sign up for [employer] or call the [employer] help line at [phone]  To view your child's test results online: Log into your own [employer] account, select your  "child's name from the tabs on the right hand side, select \"My medical record\" and select \"Test results\"  Do you have options for a visit without coming into the clinic?  [place] offers electronic visits (E-visits) and telephone visits for certain medical concerns as well as Zipnosis online.    E-visits via [employer]- generally incur a $35.00 fee.   Telephone visits- These are billed based on time spent (in 10-minute increments) on the phone with your provider.   5-10 minutes $30.00 fee   11-20 minutes $59.00 fee   21-30 minutes $85.00 fee  Zipnosis- $25.00 fee.  More information and link available on [email or web address] homepage.       "

## 2017-11-22 NOTE — NURSING NOTE
"Chief Complaint   Patient presents with     RECHECK     check iron       Initial /67  Pulse 58  Temp 96.7  F (35.9  C) (Oral)  Ht 5' 1.75\" (1.568 m)  Wt 113 lb (51.3 kg)  SpO2 100%  BMI 20.84 kg/m2 Estimated body mass index is 20.84 kg/(m^2) as calculated from the following:    Height as of this encounter: 5' 1.75\" (1.568 m).    Weight as of this encounter: 113 lb (51.3 kg).  Medication Reconciliation: complete    Ann Nath MA  "

## 2017-11-22 NOTE — PROGRESS NOTES
SUBJECTIVE:   Joyce Aquino is a 22 year old female who presents to clinic today with self because of:    Chief Complaint   Patient presents with     RECHECK     check iron        HPI  Concerns: iron concern and hormone, discuss vit that mother found patient wants to discuss with provider      ===========================================  Here today for recheck of her iron and needs a refill for her Migraines.  She ha d avery taking a prenatal vitamin for the folic acid and the iron.  She reports in the past when she has low iron her face has been tingling.  She is taking Optivit PMT and she is feeling a bit better.  She gets really bad PMS and her gynecologist thinks she Premenstrual Dysphoric Dysfunction  and birth control could help but she oliveira not tolerate the pill or IUD.  She reports working out helps.  This is getting better with consistently working out and taking the new vitamins.   She has stopped eating dairy too which has helped a lot.      She is sleeping really well.  She is busy with school and doing well.       ROS  GENERAL: Fever - no; Poor appetite - no; Sleep disruption - no  SKIN: Rash - No; Hives - No; Eczema - No;  EYE: Pain - No; Discharge - No; Redness - No; Itching - No; Vision Problems - No;  ENT: Ear Pain - No; Runny nose - No; Congestion - No; Sore Throat - No;  RESP: Cough - No; Wheezing - No; Difficulty Breathing - No;  GI: Vomiting - No; Diarrhea - No; Abdominal Pain - No; Constipation - No;  NEURO: Headache - No; Weakness - No;      PROBLEM LISTPatient Active Problem List    Diagnosis Date Noted     Family history of high cholesterol 12/09/2015     Priority: Medium     Sleeping difficulties 02/26/2015     Priority: Medium     Anemia 02/03/2015     Priority: Medium     Nevus 02/02/2015     Priority: Medium     Do you wish to do the replacement in the background? yes         Anxiety 10/20/2014     Priority: Medium     Major depressive disorder, recurrent episode, severe (H) 10/20/2014      "Priority: Medium     Problem list name updated by automated process. Provider to review       Hyperlipidemia LDL goal <160 07/08/2014     Priority: Medium     Menorrhagia 09/26/2011     Priority: Medium      MEDICATIONS  Current Outpatient Prescriptions   Medication Sig Dispense Refill     ketoconazole (NIZORAL) 2 % shampoo Use daily as needed 120 mL 11     clindamycin (CLEOCIN) 150 MG capsule Take 1 capsule (150 mg) by mouth 3 times daily 30 capsule 0     SUMAtriptan (IMITREX) 25 MG tablet Take 1-2 tablets (25-50 mg) by mouth at onset of headache for migraine May repeat in 2 hours. Max 8 tablets/24 hours. 18 tablet 1     metroNIDAZOLE (METROCREAM) 0.75 % cream Apply topically 2 times daily 45 g 11      ALLERGIES  No Known Allergies    Reviewed and updated as needed this visit by clinical staff  Tobacco  Allergies  Med Hx  Surg Hx  Fam Hx  Soc Hx        Reviewed and updated as needed this visit by Provider       OBJECTIVE:   /67  Pulse 58  Temp 96.7  F (35.9  C) (Oral)  Ht 5' 1.75\" (1.568 m)  Wt 113 lb (51.3 kg)  SpO2 100%  BMI 20.84 kg/m2  Normalized stature-for-age data not available for patients older than 20 years.  Normalized weight-for-age data not available for patients older than 20 years.  Normalized BMI data available only for age 0 to 20 years.  Normalized stature-for-age data not available for patients older than 20 years.    GENERAL: Active, alert, in no acute distress.  SKIN: Clear. No significant rash, abnormal pigmentation or lesions  HEAD: Normocephalic.  EYES:  No discharge or erythema. Normal pupils and EOM.  EARS: Normal canals. Tympanic membranes are normal; gray and translucent.  NOSE: Normal without discharge.  MOUTH/THROAT: Clear. No oral lesions. Teeth intact without obvious abnormalities.  NECK: Supple, no masses.  LYMPH NODES: No adenopathy  LUNGS: Clear. No rales, rhonchi, wheezing or retractions  HEART: Regular rhythm. Normal S1/S2. No murmurs.  ABDOMEN: Soft, " non-tender, not distended, no masses or hepatosplenomegaly. Bowel sounds normal.     DIAGNOSTICS: labs pending    ASSESSMENT/PLAN:   (D50.8) Other iron deficiency anemia  (primary encounter diagnosis)  Comment:   Plan: Iron and iron binding capacity, CBC with         platelets differential, Ferritin        Will check Iron studies today and discuss with Joyce when available.    (Z83.42) Family history of high cholesterol  Comment:   Plan: Lipid panel reflex to direct LDL Fasting      (Z13.9) Screening for condition  Comment:   Plan: TSH, T4 free, Hemoglobin A1c, Glucose            (G43.809) Other migraine without status migrainosus, not intractable  Comment:   Plan: SUMAtriptan (IMITREX) 25 MG tablet        Refilled.        (Z23) Need for vaccination  Comment:   Plan: TD PRESERV FREE >=7 YRS ADS IM              FOLLOW UPnext preventive care visit    Leta Ayala, PNP, APRN CNP

## 2017-11-30 PROBLEM — G43.809 OTHER MIGRAINE WITHOUT STATUS MIGRAINOSUS, NOT INTRACTABLE: Status: ACTIVE | Noted: 2017-11-30

## 2018-02-02 ENCOUNTER — OFFICE VISIT (OUTPATIENT)
Dept: DERMATOLOGY | Facility: CLINIC | Age: 23
End: 2018-02-02
Payer: COMMERCIAL

## 2018-02-02 VITALS
RESPIRATION RATE: 20 BRPM | OXYGEN SATURATION: 100 % | BODY MASS INDEX: 21.71 KG/M2 | DIASTOLIC BLOOD PRESSURE: 67 MMHG | HEIGHT: 61 IN | TEMPERATURE: 95.9 F | WEIGHT: 115 LBS | SYSTOLIC BLOOD PRESSURE: 101 MMHG | HEART RATE: 59 BPM

## 2018-02-02 DIAGNOSIS — L65.0 TELOGEN EFFLUVIUM: Primary | ICD-10-CM

## 2018-02-02 DIAGNOSIS — L71.0 DERMATITIS, PERIORAL: ICD-10-CM

## 2018-02-02 LAB
FERRITIN SERPL-MCNC: 28 NG/ML (ref 12–150)
T4 FREE SERPL-MCNC: 0.77 NG/DL (ref 0.76–1.46)
TSH SERPL DL<=0.005 MIU/L-ACNC: 0.89 MU/L (ref 0.4–4)

## 2018-02-02 PROCEDURE — 86038 ANTINUCLEAR ANTIBODIES: CPT | Performed by: PHYSICIAN ASSISTANT

## 2018-02-02 PROCEDURE — 84425 ASSAY OF VITAMIN B-1: CPT | Mod: 90 | Performed by: PHYSICIAN ASSISTANT

## 2018-02-02 PROCEDURE — 82607 VITAMIN B-12: CPT | Performed by: PHYSICIAN ASSISTANT

## 2018-02-02 PROCEDURE — 84630 ASSAY OF ZINC: CPT | Mod: 90 | Performed by: PHYSICIAN ASSISTANT

## 2018-02-02 PROCEDURE — 86431 RHEUMATOID FACTOR QUANT: CPT | Performed by: PHYSICIAN ASSISTANT

## 2018-02-02 PROCEDURE — 36415 COLL VENOUS BLD VENIPUNCTURE: CPT | Performed by: PHYSICIAN ASSISTANT

## 2018-02-02 PROCEDURE — 99213 OFFICE O/P EST LOW 20 MIN: CPT | Performed by: PHYSICIAN ASSISTANT

## 2018-02-02 PROCEDURE — 84443 ASSAY THYROID STIM HORMONE: CPT | Performed by: PHYSICIAN ASSISTANT

## 2018-02-02 PROCEDURE — 82627 DEHYDROEPIANDROSTERONE: CPT | Performed by: PHYSICIAN ASSISTANT

## 2018-02-02 PROCEDURE — 99000 SPECIMEN HANDLING OFFICE-LAB: CPT | Performed by: PHYSICIAN ASSISTANT

## 2018-02-02 PROCEDURE — 84439 ASSAY OF FREE THYROXINE: CPT | Performed by: PHYSICIAN ASSISTANT

## 2018-02-02 PROCEDURE — 82306 VITAMIN D 25 HYDROXY: CPT | Performed by: PHYSICIAN ASSISTANT

## 2018-02-02 PROCEDURE — 86376 MICROSOMAL ANTIBODY EACH: CPT | Performed by: PHYSICIAN ASSISTANT

## 2018-02-02 PROCEDURE — 84403 ASSAY OF TOTAL TESTOSTERONE: CPT | Performed by: PHYSICIAN ASSISTANT

## 2018-02-02 PROCEDURE — 84270 ASSAY OF SEX HORMONE GLOBUL: CPT | Performed by: PHYSICIAN ASSISTANT

## 2018-02-02 PROCEDURE — 82728 ASSAY OF FERRITIN: CPT | Performed by: PHYSICIAN ASSISTANT

## 2018-02-02 RX ORDER — PIMECROLIMUS 10 MG/G
CREAM TOPICAL
Qty: 30 G | Refills: 0 | Status: SHIPPED | OUTPATIENT
Start: 2018-02-02 | End: 2018-11-02

## 2018-02-02 RX ORDER — DOXYCYCLINE 100 MG/1
CAPSULE ORAL
Qty: 90 CAPSULE | Refills: 1 | Status: SHIPPED | OUTPATIENT
Start: 2018-02-02 | End: 2018-07-24

## 2018-02-02 RX ORDER — MULTIVITAMIN
1 TABLET ORAL DAILY
COMMUNITY
End: 2018-11-02

## 2018-02-02 NOTE — NURSING NOTE
"Chief Complaint   Patient presents with     Derm Problem     red spot near left nostril      Hair/Scalp Problem     concerned that she's having hairloss       Initial /67 (BP Location: Left arm, Patient Position: Sitting)  Pulse 59  Temp 95.9  F (35.5  C) (Tympanic)  Resp 20  Ht 1.549 m (5' 1\")  Wt 52.2 kg (115 lb)  SpO2 100%  BMI 21.73 kg/m2 Estimated body mass index is 21.73 kg/(m^2) as calculated from the following:    Height as of this encounter: 1.549 m (5' 1\").    Weight as of this encounter: 52.2 kg (115 lb).  Medication Reconciliation: complete     Dorina DESIR   [facility] Flex RN     "

## 2018-02-02 NOTE — LETTER
2/2/2018         RE: Joyce Auqino  [address] [facility] [address]        Dear Colleague,    Thank you for referring your patient, Joyce Aquino, to the [facility]. Please see a copy of my visit note below.    Joyce Aquino is a 23 year old year old female patient here today for hair loss/thinning and recurrence of perinasal dermatitis.  Patient reports that hair loss has been present since 6 months ago. She reports that she does take a multivitamin. She recently started Nioxin about one months ago. She reports that she is seeing more hair in her brush and in the shower. She is currently in school and reports that she is always stressed. She notes that her mother does have some hair thinning. She notes that perianal rash restarted about three months ago. Patient has no other skin complaints today.  Remainder of the HPI, Meds, PMH, Allergies, FH, and SH was reviewed in chart.   Past Medical History:   Diagnosis Date     Hyperlipidemia LDL goal <160 7/8/2014     Radius fracture 2002,2006,2009     UTI      Vesicoureteral reflux        Past Surgical History:   Procedure Laterality Date     NO HISTORY OF SURGERY          Family History   Problem Relation Age of Onset     Allergies Sister      Asthma Maternal Grandfather      CANCER Maternal Grandfather      DIABETES Maternal Grandmother      Hypertension Maternal Grandmother      Hypertension Paternal Grandfather      CANCER Paternal Grandfather      Lipids Father      DIABETES Father      Hypertension Father      Skin Cancer Mother      Allergies Brother      Thyroid Disease Paternal Uncle      CEREBROVASCULAR DISEASE No family hx of      Glaucoma No family hx of      Macular Degeneration No family hx of        Social History     Social History     Marital status: Single     Spouse name: N/A     Number of children: N/A     Years of education: N/A     Occupational History     Not on file.     Social History Main Topics     Smoking status:  "Never Smoker     Smokeless tobacco: Never Used      Comment: Nonsmoking household     Alcohol use No     Drug use: No     Sexual activity: Yes     Partners: Male     Other Topics Concern     Not on file     Social History Narrative       Outpatient Encounter Prescriptions as of 2/2/2018   Medication Sig Dispense Refill     doxycycline monohydrate 100 MG capsule Take twice daily for one month then decrease to once daily. 90 capsule 1     pimecrolimus (ELIDEL) 1 % cream Apply twice daily to rash on face. 30 g 0     SUMAtriptan (IMITREX) 25 MG tablet Take 1-2 tablets (25-50 mg) by mouth at onset of headache for migraine May repeat in 2 hours. Max 8 tablets/24 hours. 18 tablet 1     Multiple vitamin TABS Take 1 tablet by mouth daily       ketoconazole (NIZORAL) 2 % shampoo Use daily as needed (Patient not taking: Reported on 2/2/2018) 120 mL 11     clindamycin (CLEOCIN) 150 MG capsule Take 1 capsule (150 mg) by mouth 3 times daily (Patient not taking: Reported on 2/2/2018) 30 capsule 0     metroNIDAZOLE (METROCREAM) 0.75 % cream Apply topically 2 times daily (Patient not taking: Reported on 2/2/2018) 45 g 11     No facility-administered encounter medications on file as of 2/2/2018.              Review Of Systems  Skin: As above  Eyes: negative  Ears/Nose/Throat: negative  Respiratory: No shortness of breath, dyspnea on exertion, cough, or hemoptysis  Cardiovascular: negative  Gastrointestinal: negative  Genitourinary: negative  Musculoskeletal: negative  Neurologic: negative  Psychiatric: negative  Hematologic/Lymphatic/Immunologic: negative  Endocrine: negative      O:   NAD, WDWN, Alert & Oriented, Mood & Affect wnl, Vitals stable   Here today alone   /67 (BP Location: Left arm, Patient Position: Sitting)  Pulse 59  Temp 95.9  F (35.5  C) (Tympanic)  Resp 20  Ht 1.549 m (5' 1\")  Wt 52.2 kg (115 lb)  SpO2 100%  BMI 21.73 kg/m2   General appearance normal   Vitals stable   Alert, oriented and in no acute " distress      Hair thinning noted throughout the sab   Pink inflammatory pink point papules on left side of nose      Eyes: Conjunctivae/lids:Normal     ENT: Lips: normal    MSK:Normal    Cardiovascular: peripheral edema none    Pulm: Breathing Normal    Neuro/Psych: Orientation:Normal; Mood/Affect:Normal  A/P:  1. Telogen Effluvium   Trichogram: 40% in anagen phase.   Discussed resting hair loss.   Will order labs:   Check GRANT, DHEA-S, Ferritin,Iron and iron binding capacity, total and free T, TSH and T4, vit B1, vit D, zinc. Thyroid peroxidase antibody, RF  Continue multivitamin and nioxin shampoo.   2. Perinasal dermatitis  Start doxycycline 100 mg twice daily x 1 months, 100 mg daily x 1 month.     Continue CeraVe  Products.   Apply elidel cream twice daily to rash.     Recheck in 3 months or sooner if needed.       Again, thank you for allowing me to participate in the care of your patient.        Sincerely,        Rebeca Segura PA-C

## 2018-02-02 NOTE — PROGRESS NOTES
Joyce Aquino is a 23 year old year old female patient here today for hair loss/thinning and recurrence of perinasal dermatitis.  Patient reports that hair loss has been present since 6 months ago. She reports that she does take a multivitamin. She recently started Nioxin about one months ago. She reports that she is seeing more hair in her brush and in the shower. She is currently in school and reports that she is always stressed. She notes that her mother does have some hair thinning. She notes that perianal rash restarted about three months ago. Patient has no other skin complaints today.  Remainder of the HPI, Meds, PMH, Allergies, FH, and SH was reviewed in chart.   Past Medical History:   Diagnosis Date     Hyperlipidemia LDL goal <160 7/8/2014     Radius fracture 2002,2006,2009     UTI      Vesicoureteral reflux        Past Surgical History:   Procedure Laterality Date     NO HISTORY OF SURGERY          Family History   Problem Relation Age of Onset     Allergies Sister      Asthma Maternal Grandfather      CANCER Maternal Grandfather      DIABETES Maternal Grandmother      Hypertension Maternal Grandmother      Hypertension Paternal Grandfather      CANCER Paternal Grandfather      Lipids Father      DIABETES Father      Hypertension Father      Skin Cancer Mother      Allergies Brother      Thyroid Disease Paternal Uncle      CEREBROVASCULAR DISEASE No family hx of      Glaucoma No family hx of      Macular Degeneration No family hx of        Social History     Social History     Marital status: Single     Spouse name: N/A     Number of children: N/A     Years of education: N/A     Occupational History     Not on file.     Social History Main Topics     Smoking status: Never Smoker     Smokeless tobacco: Never Used      Comment: Nonsmoking household     Alcohol use No     Drug use: No     Sexual activity: Yes     Partners: Male     Other Topics Concern     Not on file     Social History Narrative  "      Outpatient Encounter Prescriptions as of 2/2/2018   Medication Sig Dispense Refill     doxycycline monohydrate 100 MG capsule Take twice daily for one month then decrease to once daily. 90 capsule 1     pimecrolimus (ELIDEL) 1 % cream Apply twice daily to rash on face. 30 g 0     SUMAtriptan (IMITREX) 25 MG tablet Take 1-2 tablets (25-50 mg) by mouth at onset of headache for migraine May repeat in 2 hours. Max 8 tablets/24 hours. 18 tablet 1     Multiple vitamin TABS Take 1 tablet by mouth daily       ketoconazole (NIZORAL) 2 % shampoo Use daily as needed (Patient not taking: Reported on 2/2/2018) 120 mL 11     clindamycin (CLEOCIN) 150 MG capsule Take 1 capsule (150 mg) by mouth 3 times daily (Patient not taking: Reported on 2/2/2018) 30 capsule 0     metroNIDAZOLE (METROCREAM) 0.75 % cream Apply topically 2 times daily (Patient not taking: Reported on 2/2/2018) 45 g 11     No facility-administered encounter medications on file as of 2/2/2018.              Review Of Systems  Skin: As above  Eyes: negative  Ears/Nose/Throat: negative  Respiratory: No shortness of breath, dyspnea on exertion, cough, or hemoptysis  Cardiovascular: negative  Gastrointestinal: negative  Genitourinary: negative  Musculoskeletal: negative  Neurologic: negative  Psychiatric: negative  Hematologic/Lymphatic/Immunologic: negative  Endocrine: negative      O:   NAD, WDWN, Alert & Oriented, Mood & Affect wnl, Vitals stable   Here today alone   /67 (BP Location: Left arm, Patient Position: Sitting)  Pulse 59  Temp 95.9  F (35.5  C) (Tympanic)  Resp 20  Ht 1.549 m (5' 1\")  Wt 52.2 kg (115 lb)  SpO2 100%  BMI 21.73 kg/m2   General appearance normal   Vitals stable   Alert, oriented and in no acute distress      Hair thinning noted throughout the sab   Pink inflammatory pink point papules on left side of nose      Eyes: Conjunctivae/lids:Normal     ENT: Lips: normal    MSK:Normal    Cardiovascular: peripheral edema none    Pulm: " Breathing Normal    Neuro/Psych: Orientation:Normal; Mood/Affect:Normal  A/P:  1. Telogen Effluvium   Trichogram: 40% in anagen phase.   Discussed resting hair loss.   Will order labs:   Check GRANT, DHEA-S, Ferritin,Iron and iron binding capacity, total and free T, TSH and T4, vit B1, vit D, zinc. Thyroid peroxidase antibody, RF  Continue multivitamin and nioxin shampoo.   2. Perinasal dermatitis  Start doxycycline 100 mg twice daily x 1 months, 100 mg daily x 1 month.     Continue CeraVe  Products.   Apply elidel cream twice daily to rash.     Recheck in 3 months or sooner if needed.

## 2018-02-02 NOTE — MR AVS SNAPSHOT
"              After Visit Summary   2/2/2018    Joyce Aquino    MRN: [id number]           Patient Information     Date Of Birth          1995        Visit Information        Provider Department      2/2/2018 1:20 PM Rebeca Mcintyre PA-C [facility]        Today's Diagnoses     Telogen effluvium    -  1    Dermatitis, perioral           Follow-ups after your visit        Who to contact     If you have questions or need follow up information about today's clinic visit or your schedule please contact [facility] directly at [phone].  Normal or non-critical lab and imaging results will be communicated to you by Conspirehart, letter or phone within 4 business days after the clinic has received the results. If you do not hear from us within 7 days, please contact the clinic through BrandProjectt or phone. If you have a critical or abnormal lab result, we will notify you by phone as soon as possible.  Submit refill requests through Contract Live or call your pharmacy and they will forward the refill request to us. Please allow 3 business days for your refill to be completed.          Additional Information About Your Visit        MyChart Information     Contract Live gives you secure access to your electronic health record. If you see a primary care provider, you can also send messages to your care team and make appointments. If you have questions, please call your primary care clinic.  If you do not have a primary care provider, please call [phone] and they will assist you.        Care EveryWhere ID     This is your Care EveryWhere ID. This could be used by other organizations to access your [place] medical records  [id number]        Your Vitals Were     Pulse Temperature Respirations Height Pulse Oximetry BMI (Body Mass Index)    59 95.9  F (35.5  C) (Tympanic) 20 1.549 m (5' 1\") 100% 21.73 kg/m2       Blood Pressure from Last 3 Encounters:   02/02/18 101/67   11/22/17 102/67   07/06/17 " 93/60    Weight from Last 3 Encounters:   02/02/18 52.2 kg (115 lb)   11/22/17 51.3 kg (113 lb)   07/05/17 52.6 kg (116 lb)              We Performed the Following     Anti Nuclear Renée IgG by IFA with Reflex     DHEA sulfate     Ferritin     Rheumatoid factor     T4 free     Testosterone Free and Total     Thyroid peroxidase antibody     TSH     Vitamin B1 whole blood     Vitamin B12     Vitamin D Deficiency     Zinc          Today's Medication Changes          These changes are accurate as of 2/2/18 11:59 PM.  If you have any questions, ask your nurse or doctor.               Start taking these medicines.        Dose/Directions    doxycycline monohydrate 100 MG capsule   Used for:  Telogen effluvium   Started by:  Rebeca Mcintyre PA-C        Take twice daily for one month then decrease to once daily.   Quantity:  90 capsule   Refills:  1       pimecrolimus 1 % cream   Commonly known as:  ELIDEL   Used for:  Dermatitis, perioral   Started by:  Rebeca Mcintyre PA-C        Apply twice daily to rash on face.   Quantity:  30 g   Refills:  0            Where to get your medicines      These medications were sent to [place] [address] [place], [place] - [address] [facility]  [address] [facility] [address]     Phone:  [phone]     doxycycline monohydrate 100 MG capsule    pimecrolimus 1 % cream                Primary Care Provider Office Phone # Fax #    MIGEL Zhu [facility] [phone] [address] [facility] [address]        Equal Access to Services     AVE FARLEY AH: Hadii pb oneillo Sojackieali, waaxda luqadaha, qaybta kaalmada adeegyada, zack cortez. So [facility] [phone].    ATENCIÓN: Si habla español, tiene a concepcion disposición servicios gratuitos de asistencia lingüística. Llame al [phone].    We comply with applicable federal civil rights laws and [place] laws. We do not discriminate on the basis of race,  color, national origin, age, disability, sex, sexual orientation, or gender identity.            Thank you!     Thank you for choosing [facility]  for your care. Our goal is always to provide you with excellent care. Hearing back from our patients is one way we can continue to improve our services. Please take a few minutes to complete the written survey that you may receive in the mail after your visit with us. Thank you!             Your Updated Medication List - Protect others around you: Learn how to safely use, store and throw away your medicines at www.disposemymeds.org.          This list is accurate as of 2/2/18 11:59 PM.  Always use your most recent med list.                   Brand Name Dispense Instructions for use Diagnosis    clindamycin 150 MG capsule    CLEOCIN    30 capsule    Take 1 capsule (150 mg) by mouth 3 times daily    Exudative tonsillitis, Bacterial vaginosis       doxycycline monohydrate 100 MG capsule     90 capsule    Take twice daily for one month then decrease to once daily.    Telogen effluvium       ketoconazole 2 % shampoo    NIZORAL    120 mL    Use daily as needed    Dermatitis, seborrheic       metroNIDAZOLE 0.75 % cream    METROCREAM    45 g    Apply topically 2 times daily    Dermatitis, perioral       Multiple vitamin Tabs      Take 1 tablet by mouth daily        pimecrolimus 1 % cream    ELIDEL    30 g    Apply twice daily to rash on face.    Dermatitis, perioral       SUMAtriptan 25 MG tablet    IMITREX    18 tablet    Take 1-2 tablets (25-50 mg) by mouth at onset of headache for migraine May repeat in 2 hours. Max 8 tablets/24 hours.    Other migraine without status migrainosus, not intractable

## 2018-02-03 LAB — VIT B12 SERPL-MCNC: 425 PG/ML (ref 193–986)

## 2018-02-04 LAB — RHEUMATOID FACT SER NEPH-ACNC: <20 IU/ML (ref 0–20)

## 2018-02-05 LAB
ANA SER QL IF: NEGATIVE
DEPRECATED CALCIDIOL+CALCIFEROL SERPL-MC: 38 UG/L (ref 20–75)
DHEA-S SERPL-MCNC: 211 UG/DL (ref 35–430)
THYROPEROXIDASE AB SERPL-ACNC: <10 IU/ML
ZINC SERPL-MCNC: 73 UG/DL (ref 60–120)

## 2018-02-06 LAB
SHBG SERPL-SCNC: 38 NMOL/L (ref 30–135)
TESTOST FREE SERPL-MCNC: 0.72 NG/DL (ref 0.08–0.74)
TESTOST SERPL-MCNC: 44 NG/DL (ref 8–60)

## 2018-02-07 LAB — VIT B1 BLD-MCNC: 132 NMOL/L (ref 70–180)

## 2018-07-24 ENCOUNTER — OFFICE VISIT (OUTPATIENT)
Dept: FAMILY MEDICINE | Facility: CLINIC | Age: 23
End: 2018-07-24
Payer: COMMERCIAL

## 2018-07-24 VITALS
OXYGEN SATURATION: 99 % | HEART RATE: 70 BPM | BODY MASS INDEX: 20.39 KG/M2 | HEIGHT: 61 IN | DIASTOLIC BLOOD PRESSURE: 72 MMHG | WEIGHT: 108 LBS | SYSTOLIC BLOOD PRESSURE: 107 MMHG | TEMPERATURE: 97.4 F | RESPIRATION RATE: 17 BRPM

## 2018-07-24 DIAGNOSIS — R82.90 NONSPECIFIC FINDING ON EXAMINATION OF URINE: ICD-10-CM

## 2018-07-24 DIAGNOSIS — R30.0 DYSURIA: Primary | ICD-10-CM

## 2018-07-24 LAB
ALBUMIN UR-MCNC: ABNORMAL MG/DL
APPEARANCE UR: ABNORMAL
BACTERIA #/AREA URNS HPF: ABNORMAL /HPF
BILIRUB UR QL STRIP: NEGATIVE
COLOR UR AUTO: YELLOW
GLUCOSE UR STRIP-MCNC: NEGATIVE MG/DL
HGB UR QL STRIP: ABNORMAL
KETONES UR STRIP-MCNC: NEGATIVE MG/DL
LEUKOCYTE ESTERASE UR QL STRIP: ABNORMAL
MUCOUS THREADS #/AREA URNS LPF: PRESENT /LPF
NITRATE UR QL: NEGATIVE
NON-SQ EPI CELLS #/AREA URNS LPF: ABNORMAL /LPF
PH UR STRIP: 7 PH (ref 5–7)
RBC #/AREA URNS AUTO: ABNORMAL /HPF
SOURCE: ABNORMAL
SP GR UR STRIP: 1.02 (ref 1–1.03)
UROBILINOGEN UR STRIP-ACNC: 0.2 EU/DL (ref 0.2–1)
WBC #/AREA URNS AUTO: ABNORMAL /HPF

## 2018-07-24 PROCEDURE — 81001 URINALYSIS AUTO W/SCOPE: CPT | Performed by: FAMILY MEDICINE

## 2018-07-24 PROCEDURE — 99213 OFFICE O/P EST LOW 20 MIN: CPT | Performed by: FAMILY MEDICINE

## 2018-07-24 PROCEDURE — 87086 URINE CULTURE/COLONY COUNT: CPT | Performed by: FAMILY MEDICINE

## 2018-07-24 RX ORDER — SULFAMETHOXAZOLE/TRIMETHOPRIM 800-160 MG
1 TABLET ORAL 2 TIMES DAILY
Qty: 6 TABLET | Refills: 0 | Status: SHIPPED | OUTPATIENT
Start: 2018-07-24 | End: 2018-07-27

## 2018-07-24 ASSESSMENT — PAIN SCALES - GENERAL: PAINLEVEL: SEVERE PAIN (7)

## 2018-07-24 NOTE — MR AVS SNAPSHOT
"              After Visit Summary   7/24/2018    Joyce Aquino    MRN: [id number]           Patient Information     Date Of Birth          1995        Visit Information        Provider Department      7/24/2018 10:00 AM Delores Cortes MD [facility]        Today's Diagnoses     Dysuria    -  1    Nonspecific finding on examination of urine           Follow-ups after your visit        Who to contact     If you have questions or need follow up information about today's clinic visit or your schedule please contact [facility] directly at [phone].  Normal or non-critical lab and imaging results will be communicated to you by JumpTimehart, letter or phone within 4 business days after the clinic has received the results. If you do not hear from us within 7 days, please contact the clinic through Koibanxt or phone. If you have a critical or abnormal lab result, we will notify you by phone as soon as possible.  Submit refill requests through Retora Black or call your pharmacy and they will forward the refill request to us. Please allow 3 business days for your refill to be completed.          Additional Information About Your Visit        MyChart Information     Retora Black gives you secure access to your electronic health record. If you see a primary care provider, you can also send messages to your care team and make appointments. If you have questions, please call your primary care clinic.  If you do not have a primary care provider, please call [phone] and they will assist you.        Care EveryWhere ID     This is your Care EveryWhere ID. This could be used by other organizations to access your [place] medical records  [id number]        Your Vitals Were     Pulse Temperature Respirations Height Last Period Pulse Oximetry    70 97.4  F (36.3  C) (Oral) 17 5' 1\" (1.549 m) 07/01/2018 99%    BMI (Body Mass Index)                   20.41 kg/m2            Blood Pressure from Last 3 Encounters: "   07/24/18 107/72   02/02/18 101/67   11/22/17 102/67    Weight from Last 3 Encounters:   07/24/18 108 lb (49 kg)   02/02/18 115 lb (52.2 kg)   11/22/17 113 lb (51.3 kg)              We Performed the Following     *UA reflex to Microscopic and Culture ([place] and [facility] (except [place] and [place])     Urine Culture Aerobic Bacterial     Urine Microscopic          Today's Medication Changes          These changes are accurate as of 7/24/18 11:41 AM.  If you have any questions, ask your nurse or doctor.               Start taking these medicines.        Dose/Directions    sulfamethoxazole-trimethoprim 800-160 MG per tablet   Commonly known as:  BACTRIM DS/SEPTRA DS   Used for:  Dysuria   Started by:  Delores Cortes MD        Dose:  1 tablet   Take 1 tablet by mouth 2 times daily for 3 days   Quantity:  6 tablet   Refills:  0            Where to get your medicines      These medications were sent to [place] [address] [place], [place] - [address] [facility]  [address] [facility] [address]     Phone:  [phone]     sulfamethoxazole-trimethoprim 800-160 MG per tablet                Primary Care Provider Office Phone # Fax #    Leta Ayala APRALFONSO [facility] [phone] [address] [facility] [address]        Equal Access to Services     MAURO FARLEY : Hadii pb field hadasho Sojackieali, waaxda luqadaha, qaybta kaalmada adeegyada, zack cortez. So [facility] [phone].    ATENCIÓN: Si habla español, tiene a concepcion disposición servicios gratuitos de asistencia lingüística. Jonathan al [phone].    We comply with applicable federal civil rights laws and [place] laws. We do not discriminate on the basis of race, color, national origin, age, disability, sex, sexual orientation, or gender identity.            Thank you!     Thank you for choosing [facility]  for your care. Our goal is always to provide you with excellent care.  Hearing back from our patients is one way we can continue to improve our services. Please take a few minutes to complete the written survey that you may receive in the mail after your visit with us. Thank you!             Your Updated Medication List - Protect others around you: Learn how to safely use, store and throw away your medicines at www.disposemymeds.org.          This list is accurate as of 7/24/18 11:41 AM.  Always use your most recent med list.                   Brand Name Dispense Instructions for use Diagnosis    ketoconazole 2 % shampoo    NIZORAL    120 mL    Use daily as needed    Dermatitis, seborrheic       Multiple vitamin Tabs      Take 1 tablet by mouth daily        pimecrolimus 1 % cream    ELIDEL    30 g    Apply twice daily to rash on face.    Dermatitis, perioral       sulfamethoxazole-trimethoprim 800-160 MG per tablet    BACTRIM DS/SEPTRA DS    6 tablet    Take 1 tablet by mouth 2 times daily for 3 days    Dysuria       SUMAtriptan 25 MG tablet    IMITREX    18 tablet    Take 1-2 tablets (25-50 mg) by mouth at onset of headache for migraine May repeat in 2 hours. Max 8 tablets/24 hours.    Other migraine without status migrainosus, not intractable

## 2018-07-24 NOTE — PROGRESS NOTES
"  SUBJECTIVE:   Joyce Aquino is a 23 year old female who presents to clinic today for the following health issues:        URINARY TRACT SYMPTOMS      Duration: 2 days     Description  frequency, urgency and burning     Intensity:  moderate    Accompanying signs and symptoms:  Fever/chills: no   Flank pain no   Nausea and vomiting: YES  Vaginal symptoms: none  Abdominal/Pelvic Pain: no     History  History of frequent UTI's: YES  History of kidney stones: no   Sexually Active: YES  Possibility of pregnancy: use of condoms    Precipitating or alleviating factors: None    Therapies tried and outcome: increase fluid intake     Pt with symptoms above. Has h/o UTIs in the past  No back pain or fever or vomiting      Problem list and histories reviewed & adjusted, as indicated.  Additional history: as documented    Labs reviewed in EPIC    Reviewed and updated as needed this visit by clinical staff  Tobacco  Allergies  Meds  Med Hx  Surg Hx  Fam Hx  Soc Hx      Reviewed and updated as needed this visit by Provider         ROS:  Constitutional, HEENT, cardiovascular, pulmonary, gi and gu systems are negative, except as otherwise noted.    OBJECTIVE:     /72  Pulse 70  Temp 97.4  F (36.3  C) (Oral)  Resp 17  Ht 5' 1\" (1.549 m)  Wt 108 lb (49 kg)  LMP 07/01/2018  SpO2 99%  BMI 20.41 kg/m2  Body mass index is 20.41 kg/(m^2).  GENERAL: healthy, alert and no distress  RESP: lungs clear to auscultation - no rales, rhonchi or wheezes  CV: regular rate and rhythm, normal S1 S2, no S3 or S4, no murmur, click or rub, no peripheral edema and peripheral pulses strong  ABDOMEN: soft, nontender, no hepatosplenomegaly, no masses and bowel sounds normal    Diagnostic Test Results:  Urinalysis - UA RESULTS:  Recent Labs   Lab Test  07/24/18   1031   COLOR  Yellow   APPEARANCE  Slightly Cloudy   URINEGLC  Negative   URINEBILI  Negative   URINEKETONE  Negative   SG  1.020   UBLD  Moderate*   URINEPH  7.0   PROTEIN  " Trace*   UROBILINOGEN  0.2   NITRITE  Negative   LEUKEST  Moderate*   RBCU  10-25*   WBCU  *         ASSESSMENT/PLAN:     1. Dysuria  FU if symptoms are not improving  - *UA reflex to Microscopic and Culture ([place] [facility] (except [place] and [place])  - Urine Microscopic  - sulfamethoxazole-trimethoprim (BACTRIM DS/SEPTRA DS) 800-160 MG per tablet; Take 1 tablet by mouth 2 times daily for 3 days  Dispense: 6 tablet; Refill: 0    2. Nonspecific finding on examination of urine    - Urine Culture Aerobic Bacterial        Delores Liz MD  [facility]

## 2018-07-25 LAB
BACTERIA SPEC CULT: NORMAL
SPECIMEN SOURCE: NORMAL

## 2018-11-02 ENCOUNTER — OFFICE VISIT (OUTPATIENT)
Dept: FAMILY MEDICINE | Facility: CLINIC | Age: 23
End: 2018-11-02
Payer: COMMERCIAL

## 2018-11-02 VITALS
OXYGEN SATURATION: 99 % | WEIGHT: 112.4 LBS | DIASTOLIC BLOOD PRESSURE: 73 MMHG | SYSTOLIC BLOOD PRESSURE: 111 MMHG | TEMPERATURE: 97.5 F | BODY MASS INDEX: 21.24 KG/M2 | HEART RATE: 63 BPM

## 2018-11-02 DIAGNOSIS — F33.2 SEVERE EPISODE OF RECURRENT MAJOR DEPRESSIVE DISORDER, WITHOUT PSYCHOTIC FEATURES (H): Primary | ICD-10-CM

## 2018-11-02 LAB
ALBUMIN SERPL-MCNC: 4.4 G/DL (ref 3.4–5)
ALP SERPL-CCNC: 67 U/L (ref 40–150)
ALT SERPL W P-5'-P-CCNC: 24 U/L (ref 0–50)
ANION GAP SERPL CALCULATED.3IONS-SCNC: 4 MMOL/L (ref 3–14)
AST SERPL W P-5'-P-CCNC: 14 U/L (ref 0–45)
BILIRUB SERPL-MCNC: 0.3 MG/DL (ref 0.2–1.3)
BUN SERPL-MCNC: 14 MG/DL (ref 7–30)
CALCIUM SERPL-MCNC: 9.1 MG/DL (ref 8.5–10.1)
CHLORIDE SERPL-SCNC: 105 MMOL/L (ref 94–109)
CO2 SERPL-SCNC: 29 MMOL/L (ref 20–32)
CREAT SERPL-MCNC: 0.58 MG/DL (ref 0.52–1.04)
ERYTHROCYTE [DISTWIDTH] IN BLOOD BY AUTOMATED COUNT: 12.4 % (ref 10–15)
GFR SERPL CREATININE-BSD FRML MDRD: >90 ML/MIN/1.7M2
GLUCOSE SERPL-MCNC: 83 MG/DL (ref 70–99)
HCT VFR BLD AUTO: 45.9 % (ref 35–47)
HGB BLD-MCNC: 15.6 G/DL (ref 11.7–15.7)
MCH RBC QN AUTO: 30.7 PG (ref 26.5–33)
MCHC RBC AUTO-ENTMCNC: 34 G/DL (ref 31.5–36.5)
MCV RBC AUTO: 90 FL (ref 78–100)
PLATELET # BLD AUTO: 236 10E9/L (ref 150–450)
POTASSIUM SERPL-SCNC: 4.1 MMOL/L (ref 3.4–5.3)
PROT SERPL-MCNC: 8.1 G/DL (ref 6.8–8.8)
RBC # BLD AUTO: 5.08 10E12/L (ref 3.8–5.2)
SODIUM SERPL-SCNC: 138 MMOL/L (ref 133–144)
TSH SERPL DL<=0.005 MIU/L-ACNC: 0.76 MU/L (ref 0.4–4)
VIT B12 SERPL-MCNC: 643 PG/ML (ref 193–986)
WBC # BLD AUTO: 7.5 10E9/L (ref 4–11)

## 2018-11-02 PROCEDURE — 99214 OFFICE O/P EST MOD 30 MIN: CPT | Performed by: FAMILY MEDICINE

## 2018-11-02 PROCEDURE — 36415 COLL VENOUS BLD VENIPUNCTURE: CPT | Performed by: FAMILY MEDICINE

## 2018-11-02 PROCEDURE — 80053 COMPREHEN METABOLIC PANEL: CPT | Performed by: FAMILY MEDICINE

## 2018-11-02 PROCEDURE — 82607 VITAMIN B-12: CPT | Performed by: FAMILY MEDICINE

## 2018-11-02 PROCEDURE — 85027 COMPLETE CBC AUTOMATED: CPT | Performed by: FAMILY MEDICINE

## 2018-11-02 PROCEDURE — 84443 ASSAY THYROID STIM HORMONE: CPT | Performed by: FAMILY MEDICINE

## 2018-11-02 PROCEDURE — 82306 VITAMIN D 25 HYDROXY: CPT | Performed by: FAMILY MEDICINE

## 2018-11-02 RX ORDER — ESCITALOPRAM OXALATE 10 MG/1
10 TABLET ORAL DAILY
Qty: 30 TABLET | Refills: 1 | Status: SHIPPED | OUTPATIENT
Start: 2018-11-02 | End: 2017-06-25

## 2018-11-02 ASSESSMENT — ANXIETY QUESTIONNAIRES
2. NOT BEING ABLE TO STOP OR CONTROL WORRYING: NEARLY EVERY DAY
5. BEING SO RESTLESS THAT IT IS HARD TO SIT STILL: SEVERAL DAYS
7. FEELING AFRAID AS IF SOMETHING AWFUL MIGHT HAPPEN: SEVERAL DAYS
IF YOU CHECKED OFF ANY PROBLEMS ON THIS QUESTIONNAIRE, HOW DIFFICULT HAVE THESE PROBLEMS MADE IT FOR YOU TO DO YOUR WORK, TAKE CARE OF THINGS AT HOME, OR GET ALONG WITH OTHER PEOPLE: VERY DIFFICULT
3. WORRYING TOO MUCH ABOUT DIFFERENT THINGS: NEARLY EVERY DAY
GAD7 TOTAL SCORE: 15
1. FEELING NERVOUS, ANXIOUS, OR ON EDGE: MORE THAN HALF THE DAYS
6. BECOMING EASILY ANNOYED OR IRRITABLE: MORE THAN HALF THE DAYS

## 2018-11-02 ASSESSMENT — PATIENT HEALTH QUESTIONNAIRE - PHQ9
SUM OF ALL RESPONSES TO PHQ QUESTIONS 1-9: 16
5. POOR APPETITE OR OVEREATING: NEARLY EVERY DAY

## 2018-11-02 NOTE — PROGRESS NOTES
SUBJECTIVE:   Joyce Aquino is a 23 year old female who presents to clinic today for the following health issues:      Abnormal Mood Symptoms  Onset: x3 months    Description:   Depression: YES  Anxiety: YES    Accompanying Signs & Symptoms:  Still participating in activities that you used to enjoy: YES  Fatigue: YES  Irritability: YES  Difficulty concentrating: YES  Changes in appetite: YES  Problems with sleep: YES  Heart racing/beating fast : YES- with anxiety  Thoughts of hurting yourself or others: yes- suicidal thoughts but no plans to carry them out.    History:   Recent stress: YES- graduating from college, is now NICU nurse; started about 3-4 months ago and is still adjusting.  Prior depression hospitalization: 2.5 years ago   Family history of depression: YES- fathers side: uncle and cousin; material aunt   Family history of anxiety: YES- cousin on fathers side     Precipitating factors:   Alcohol/drug use: no    Alleviating factors:  Exercising     Therapies Tried and outcome: Klonopin (Clonazepam), Lexapro (Escitalopram), Wellbutrin (Bupropion), Zoloft (Sertraline) - does not like taking serotonin specific reuptake inhibitor; makes he feel foggy and tired.  Liked taking Pristiq but would sometimes notice her heart racing with taking these.   States that she has been off medication and seeing a psychotherapist for over a year.    RUTH-7   [employer], 2002; Used with Permission) 11/2/2018   1. Feeling nervous, anxious, or on edge 2   2. Not being able to stop or control worrying 3   3. Worrying too much about different things 3   4. Trouble relaxing 3   5. Being so restless that it is hard to sit still 1   6. Becoming easily annoyed or irritable 2   7. Feeling afraid, as if something awful might happen 1   RUTH-7 Total Score 15   If you checked any problems, how difficult have they made it for you to do your work, take care of things at home, or get along with other people? Very difficult     PHQ-9  ([employer]) 11/2/2018   1.  Little interest or pleasure in doing things 0   2.  Feeling down, depressed, or hopeless 2   3.  Trouble falling or staying asleep, or sleeping too much 2   4.  Feeling tired or having little energy 3   5.  Poor appetite or overeating 3   6.  Feeling bad about yourself 2   7.  Trouble concentrating 2   8.  Moving slowly or restless 1   9.  Suicidal or self-harm thoughts 1   PHQ-9 Total Score 16   Difficulty at work, home, or with people Very difficult       Problem list and histories reviewed & adjusted, as indicated.  Additional history: as documented    Patient Active Problem List   Diagnosis     Menorrhagia     Hyperlipidemia LDL goal <160     Anxiety     Major depressive disorder, recurrent episode, severe (H)     Nevus     Anemia     Sleeping difficulties     Family history of high cholesterol     Other migraine without status migrainosus, not intractable     Past Surgical History:   Procedure Laterality Date     NO HISTORY OF SURGERY         Social History   Substance Use Topics     Smoking status: Never Smoker     Smokeless tobacco: Never Used      Comment: Nonsmoking household     Alcohol use No     Family History   Problem Relation Age of Onset     Allergies Sister      Asthma Maternal Grandfather      Cancer Maternal Grandfather      Diabetes Maternal Grandmother      Hypertension Maternal Grandmother      Hypertension Paternal Grandfather      Cancer Paternal Grandfather      Lipids Father      Diabetes Father      Hypertension Father      Skin Cancer Mother      Allergies Brother      Thyroid Disease Paternal Uncle      Cerebrovascular Disease No family hx of      Glaucoma No family hx of      Macular Degeneration No family hx of          Current Outpatient Prescriptions   Medication Sig Dispense Refill     escitalopram (LEXAPRO) 10 MG tablet Take 1 tablet (10 mg) by mouth daily 30 tablet 1     No Known Allergies    Reviewed and updated as needed this visit by clinical  staff  Tobacco  Allergies  Meds  Problems       Reviewed and updated as needed this visit by Provider  Allergies  Problems         ROS:  Constitutional, HEENT, cardiovascular, pulmonary, gi and gu systems are negative, except as otherwise noted.    OBJECTIVE:     /73  Pulse 63  Temp 97.5  F (36.4  C) (Tympanic)  Wt 112 lb 6.4 oz (51 kg)  LMP 10/10/2018 (Exact Date)  SpO2 99%  Breastfeeding? No  BMI 21.24 kg/m2  Body mass index is 21.24 kg/(m^2).  GENERAL: healthy, alert and no distress  PSYCH: mentation appears normal, affect flat, tearful, judgement and insight intact and appearance well groomed    Diagnostic Test Results:  Labs drawn and in process.    ASSESSMENT/PLAN:     Joyce was seen today for depression and anxiety.    Diagnoses and all orders for this visit:    Severe episode of recurrent major depressive disorder, without psychotic features (H)  -     PHQ-9/RUTH 7 completed, see above/Epic for details    -     CBC with platelets  -     Comprehensive metabolic panel  -     TSH with free T4 reflex  -     Vitamin D Deficiency  -     Vitamin B12  -     Wishes to try SSRIs again. Treatment options discussed. Initially agreed to Fluoxetine then changed her mind and wanted to try Lexapro again.   Start: escitalopram (LEXAPRO) 10 MG tablet; Take 1 tablet (10 mg) by mouth daily  -     MENTAL HEALTH REFERRAL  - Adult; Outpatient Treatment; Individual/Couples/Family/Group Therapy/Health Psychology; Other: Behavioral Healthcare Providers [phone]; We will contact you to schedule the appointment or please call with any questi...        Follow-up in a month sooner if needed    Tele/Mychart follow-up in 2 weeks..      Lizzette Beard MD  [facility]

## 2018-11-02 NOTE — MR AVS SNAPSHOT
After Visit Summary   11/2/2018    Joyce Aquino    MRN: [id number]           Patient Information     Date Of Birth          1995        Visit Information        Provider Department      11/2/2018 2:00 PM Lizzette Beard MD [facility]        Today's Diagnoses     Adjustment reaction with anxiety and depression    -  1       Follow-ups after your visit        Follow-up notes from your care team     Return in about 1 month (around 12/2/2018), or if symptoms worsen or fail to improve.      Who to contact     Normal or non-critical lab and imaging results will be communicated to you by [employer], letter or phone within 4 business days after the clinic has received the results. If you do not hear from us within 7 days, please contact the clinic through [employer] or phone. If you have a critical or abnormal lab result, we will notify you by phone as soon as possible.  Submit refill requests through [employer] or call your pharmacy and they will forward the refill request to us. Please allow 3 business days for your refill to be completed.          If you need to speak with a  for additional information , please call: [phone]             Additional Information About Your Visit        [employer] Information     [employer] gives you secure access to your electronic health record. If you see a primary care provider, you can also send messages to your care team and make appointments. If you have questions, please call your primary care clinic.  If you do not have a primary care provider, please call [phone] and they will assist you.        Care EveryWhere ID     This is your Care EveryWhere ID. This could be used by other organizations to access your [facility] medical records  [id number]        Your Vitals Were     Pulse Temperature Last Period Pulse Oximetry Breastfeeding? BMI (Body Mass Index)    63 97.5  F (36.4  C) (Tympanic) 10/10/2018 (Exact Date) 99% No 21.24 kg/m2        Blood Pressure from Last 3 Encounters:   11/02/18 111/73   07/24/18 107/72   02/02/18 101/67    Weight from Last 3 Encounters:   11/02/18 112 lb 6.4 oz (51 kg)   07/24/18 108 lb (49 kg)   02/02/18 115 lb (52.2 kg)              We Performed the Following     CBC with platelets     Comprehensive metabolic panel     TSH with free T4 reflex     Vitamin B12     Vitamin D Deficiency          Today's Medication Changes          These changes are accurate as of 11/2/18  3:13 PM.  If you have any questions, ask your nurse or doctor.               Start taking these medicines.        Dose/Directions    FLUoxetine 20 MG capsule   Commonly known as:  PROzac   Used for:  Adjustment reaction with anxiety and depression   Started by:  Lizzette Beard MD        Dose:  20 mg   Take 1 capsule (20 mg) by mouth daily   Quantity:  30 capsule   Refills:  1            Where to get your medicines      These medications were sent to [place] [address] [place] [facility] [address]  [address], [facility] [address]     Phone:  [phone]     FLUoxetine 20 MG capsule                Primary Care Provider Office Phone # Fax #    Leta Ayala, APRN [facility] [phone] [address] [facility] [address]        Equal Access to Services     AVE FARLEY : Sy field hadasho Soomaali, waaxda luqadaha, qaybta kaalmada adeegyada, zack cortez. So [facility] [phone].    ATENCIÓN: Si habla español, tiene a concepcion disposición servicios gratuitos de asistencia lingüística. Llame al [phone].    We comply with applicable federal civil rights laws and [place] laws. We do not discriminate on the basis of race, color, national origin, age, disability, sex, sexual orientation, or gender identity.            Thank you!     Thank you for choosing [facility]  for your care. Our goal is always to provide you with excellent care. Hearing back from our  patients is one way we can continue to improve our services. Please take a few minutes to complete the written survey that you may receive in the mail after your visit with us. Thank you!             Your Updated Medication List - Protect others around you: Learn how to safely use, store and throw away your medicines at www.disposemymeds.org.          This list is accurate as of 11/2/18  3:13 PM.  Always use your most recent med list.                   Brand Name Dispense Instructions for use Diagnosis    FLUoxetine 20 MG capsule    PROzac    30 capsule    Take 1 capsule (20 mg) by mouth daily    Adjustment reaction with anxiety and depression

## 2018-11-02 NOTE — Clinical Note
Please schedule a Tele/Mychart follow up in 2 weeks to check on how patient is doing on the medication

## 2018-11-03 ASSESSMENT — ANXIETY QUESTIONNAIRES: GAD7 TOTAL SCORE: 15

## 2018-11-03 ASSESSMENT — ASTHMA QUESTIONNAIRES: ACT_TOTALSCORE: 25

## 2018-11-04 LAB — DEPRECATED CALCIDIOL+CALCIFEROL SERPL-MC: 27 UG/L (ref 20–75)

## 2018-12-03 ENCOUNTER — MYC REFILL (OUTPATIENT)
Dept: FAMILY MEDICINE | Facility: CLINIC | Age: 23
End: 2018-12-03

## 2018-12-03 DIAGNOSIS — F33.2 SEVERE EPISODE OF RECURRENT MAJOR DEPRESSIVE DISORDER, WITHOUT PSYCHOTIC FEATURES (H): ICD-10-CM

## 2018-12-03 NOTE — TELEPHONE ENCOUNTER
[facility]  Original authorizing provider: MD Joyce Weston would like a refill of the following medications:  escitalopram (LEXAPRO) 10 MG tablet [Lizzette Beard MD]    Preferred pharmacy: [address] [facility]    Comment:  This message is being sent by Rebecca Aquino on behalf of Joyce Aquino

## 2018-12-04 RX ORDER — ESCITALOPRAM OXALATE 10 MG/1
10 TABLET ORAL DAILY
Qty: 30 TABLET | Refills: 1 | OUTPATIENT
Start: 2018-12-04

## 2018-12-04 NOTE — TELEPHONE ENCOUNTER
See refill encounter from 12/3.   Medication sent in to pharmacy on 11/2/18 for a 2 month supply.    Zoraida Camacho, RN, BSN, PHN

## 2019-03-12 ENCOUNTER — TELEPHONE (OUTPATIENT)
Dept: FAMILY MEDICINE | Facility: CLINIC | Age: 24
End: 2019-03-12

## 2019-03-12 ASSESSMENT — PATIENT HEALTH QUESTIONNAIRE - PHQ9: SUM OF ALL RESPONSES TO PHQ QUESTIONS 1-9: 3

## 2019-03-12 NOTE — TELEPHONE ENCOUNTER
Panel Management Review      Patient has the following on her problem list:     Depression / Dysthymia review    Measure:  Needs PHQ-9 score of 4 or less during index window.  Administer PHQ-9 and if score is 5 or more, send encounter to provider for next steps.    5 - 7 month window range: 12/22/18-4/22/19    PHQ-9 SCORE 6/28/2016 7/27/2016 11/2/2018   PHQ-9 Total Score - - -   PHQ-9 Total Score MyChart 6 (Mild depression) - -   PHQ-9 Total Score - 12 16       If PHQ-9 recheck is 5 or more, route to provider for next steps.    Patient is due for:  PHQ9 and DAP      Composite cancer screening  Chart review shows that this patient is due/due soon for the following Pap Smear  Summary:    Patient is due/failing the following:   AAP, DAP, PAP, PHQ9 and PHYSICAL    Action needed:   Patient needs office visit for physical/pap. and Patient needs to do PHQ9.    Type of outreach:    Phone, spoke to patient.  Updated PHQ9. Pt said that she will schedule physical on MyChart.    Questions for provider review:    None                                                                                                                                    Eve Garrison MA

## 2019-11-09 ENCOUNTER — HEALTH MAINTENANCE LETTER (OUTPATIENT)
Age: 24
End: 2019-11-09

## 2020-02-23 ENCOUNTER — HEALTH MAINTENANCE LETTER (OUTPATIENT)
Age: 25
End: 2020-02-23

## 2020-12-06 ENCOUNTER — HEALTH MAINTENANCE LETTER (OUTPATIENT)
Age: 25
End: 2020-12-06

## 2021-06-21 NOTE — PROGRESS NOTES
"     SUBJECTIVE:   CC: Joyce Aquino is an 26 year old woman who presents for preventive health visit.       Patient has been advised of split billing requirements and indicates understanding: Yes  Healthy Habits:     Getting at least 3 servings of Calcium per day:  Yes    Bi-annual eye exam:  NO    Dental care twice a year:  Yes    Sleep apnea or symptoms of sleep apnea:  None    Diet:  Vegetarian/vegan    Frequency of exercise:  2-3 days/week    Duration of exercise:  45-60 minutes    Taking medications regularly:  Yes    Medication side effects:  Not applicable    PHQ-2 Total Score: 2    Additional concerns today:  Yes    Presents for annual physical exam.  Planning to start a family and wants a check-up.  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  Pap completed 7/23/2020.    Anxiety - feels stable currently. Working on increasing healthy habits - exercise, eating healthy. She plans to see a therapist in [place] but has not established care yet. Situational stressors with upcoming wedding, moving to [place], and starting a new job. Is interested today in discussing potential medication options that would be safe to take during pregnancy. She has not been on any medication for anxiety since December of 2020. In the past has tried lexapro, buspar, sertraline, hydroxyzine, and clonazepam. She reports feeling \"crummy\" with fatigue and stomach upset when taking them.     Menstrual cycles - regular cycles lasting 32 days. Reports heavy bleeding and cramping for first 3 days and then bleeding slows down for the next 5 days. Reports mood flucuates the week prior to starting her cycle.       Today's PHQ-2 Score:   PHQ-2 ( 1999 [employer]) 6/25/2021   Q1: Little interest or pleasure in doing things 1   Q2: Feeling down, depressed or hopeless 1   PHQ-2 Score 2   Q1: Little interest or pleasure in doing things Several days   Q2: Feeling down, depressed or hopeless Several days   PHQ-2 Score 2     PHQ 11/2/2018 " 3/12/2019 6/25/2021   PHQ-9 Total Score 16 3 6   Q9: Thoughts of better off dead/self-harm past 2 weeks Several days Not at all Not at all     RUTH-7 SCORE 7/27/2016 11/2/2018 6/25/2021   Total Score - - -   Total Score - - -   Total Score 12 15 8         Abuse: Current or Past (Physical, Sexual or Emotional) - No  Do you feel safe in your environment? Yes    Have you ever done Advance Care Planning? (For example, a Health Directive, POLST, or a discussion with a medical provider or your loved ones about your wishes): No, advance care planning information given to patient to review.  Patient declined advance care planning discussion at this time.    Social History     Tobacco Use     Smoking status: Never Smoker     Smokeless tobacco: Never Used     Tobacco comment: Nonsmoking household   Substance Use Topics     Alcohol use: No     Alcohol/week: 0.0 standard drinks         Alcohol Use 6/25/2021   Prescreen: >3 drinks/day or >7 drinks/week? No   Prescreen: >3 drinks/day or >7 drinks/week? -       Reviewed orders with patient.  Reviewed health maintenance and updated orders accordingly - Yes    Breast Cancer Screening:  Any new diagnosis of family breast, ovarian, or bowel cancer? No    FSH-7:   Breast CA Risk Assessment (FHS-7) 6/25/2021   Did any of your first-degree relatives have breast or ovarian cancer? No   Did any of your relatives have bilateral breast cancer? Unknown   Did any man in your family have breast cancer? No   Did any woman in your family have breast and ovarian cancer? No   Did any woman in your family have breast cancer before age 50 y? No   Do you have 2 or more relatives with breast and/or ovarian cancer? No   Do you have 2 or more relatives with breast and/or bowel cancer? No       Pertinent mammograms are reviewed under the imaging tab.    History of abnormal Pap smear: NO - age 21-29 PAP every 3 years recommended     Reviewed and updated as needed this visit by clinical staff  Tobacco   "Allergies  Meds   Med Hx  Surg Hx  Fam Hx  Soc Hx        Reviewed and updated as needed this visit by Provider               Review of Systems   Constitutional: Negative for chills and fever.   HENT: Negative for congestion, ear pain, hearing loss and sore throat.    Eyes: Negative for pain and visual disturbance.   Respiratory: Negative for cough and shortness of breath.    Cardiovascular: Negative for chest pain, palpitations and peripheral edema.   Gastrointestinal: Negative for abdominal pain, constipation, diarrhea, heartburn, hematochezia and nausea.   Genitourinary: Negative for dysuria, frequency, genital sores, hematuria and urgency.   Musculoskeletal: Negative for arthralgias, joint swelling and myalgias.   Skin: Negative for rash.   Neurological: Negative for dizziness, weakness, headaches and paresthesias.   Psychiatric/Behavioral: Positive for mood changes. The patient is nervous/anxious.        OBJECTIVE:   /84   Pulse 68   Temp 98.3  F (36.8  C) (Oral)   Ht 1.575 m (5' 2\")   Wt 48.4 kg (106 lb 9.6 oz)   SpO2 100%   BMI 19.50 kg/m    Physical Exam  GENERAL: healthy, alert and no distress  EYES: Eyes grossly normal to inspection, PERRL and conjunctivae and sclerae normal  HENT: ear canals and TM's normal, nose and mouth without ulcers or lesions  NECK: no adenopathy, no asymmetry, masses, or scars and thyroid normal to palpation  RESP: lungs clear to auscultation - no rales, rhonchi or wheezes  BREAST: normal without  tenderness or nipple discharge and no palpable axillary masses or adenopathy. 1 cm firm non-tender nodules noted 1 cm from areola at 12:00 position bilaterally.   CV: regular rate and rhythm, normal S1 S2, no S3 or S4, no murmur, click or rub, no peripheral edema and peripheral pulses strong  ABDOMEN: soft, nontender, no hepatosplenomegaly, no masses and bowel sounds normal  MS: no gross musculoskeletal defects noted, no edema  SKIN: no suspicious lesions or rashes  NEURO: " "Normal strength and tone, mentation intact and speech normal  PSYCH: mentation appears normal, affect normal/bright    ASSESSMENT/PLAN:   (Z00.00) Routine general medical examination at a health care facility  (primary encounter diagnosis)  Comment: Due  Plan: Preventative needs reviewed. Likely fibrocystic breast changes noted bilaterally. Patient is currently menstruating. Instructed patient to complete monthly self breast exams and to contact clinic if she notices any changes.     (Z86.2) History of anemia  Comment: Recently stable  Plan: CBC with platelets         (Z13.1) Screening for diabetes mellitus  Comment: Due  Plan: Basic metabolic panel  (Ca, Cl, CO2, Creat,         Gluc, K, Na, BUN)         (Z13.220) Screening for hyperlipidemia  Comment: Due  Plan: Lipid panel reflex to direct LDL Fasting          (Z11.4) Screening for HIV (human immunodeficiency virus)  Comment: Due  Plan: HIV Antigen Antibody Combo          (Z11.59) Need for hepatitis C screening test  Comment: Due  Plan: **Hepatitis C Screen Reflex to RNA FUTURE         anytime          (Z13.21) Encounter for vitamin deficiency screening  Comment: Patient reports not eating meat. She is requesting B12 testing today.  Plan: Vitamin B12          Patient has been advised of split billing requirements and indicates understanding: Yes  COUNSELING:  Reviewed preventive health counseling, as reflected in patient instructions  Special attention given to:        Regular exercise       Healthy diet/nutrition       Family planning    Estimated body mass index is 19.5 kg/m  as calculated from the following:    Height as of this encounter: 1.575 m (5' 2\").    Weight as of this encounter: 48.4 kg (106 lb 9.6 oz).      She reports that she has never smoked. She has never used smokeless tobacco.      Counseling Resources:  ATP IV Guidelines  Pooled Cohorts Equation Calculator  Breast Cancer Risk Calculator  BRCA-Related Cancer Risk Assessment: FHS-7 Tool  FRAX " Risk Assessment  ICSI Preventive Guidelines  Dietary Guidelines for Americans, 2010  USDA's MyPlate  ASA Prophylaxis  Lung CA Screening    MIGEL Howard CNP  [facility]

## 2021-06-25 ENCOUNTER — OFFICE VISIT (OUTPATIENT)
Dept: FAMILY MEDICINE | Facility: CLINIC | Age: 26
End: 2021-06-25
Payer: COMMERCIAL

## 2021-06-25 VITALS
BODY MASS INDEX: 19.62 KG/M2 | DIASTOLIC BLOOD PRESSURE: 84 MMHG | SYSTOLIC BLOOD PRESSURE: 124 MMHG | TEMPERATURE: 98.3 F | OXYGEN SATURATION: 100 % | HEART RATE: 68 BPM | WEIGHT: 106.6 LBS | HEIGHT: 62 IN

## 2021-06-25 DIAGNOSIS — Z13.1 SCREENING FOR DIABETES MELLITUS: ICD-10-CM

## 2021-06-25 DIAGNOSIS — Z11.59 NEED FOR HEPATITIS C SCREENING TEST: ICD-10-CM

## 2021-06-25 DIAGNOSIS — Z13.21 ENCOUNTER FOR VITAMIN DEFICIENCY SCREENING: ICD-10-CM

## 2021-06-25 DIAGNOSIS — Z11.4 SCREENING FOR HIV (HUMAN IMMUNODEFICIENCY VIRUS): ICD-10-CM

## 2021-06-25 DIAGNOSIS — Z13.220 SCREENING FOR HYPERLIPIDEMIA: ICD-10-CM

## 2021-06-25 DIAGNOSIS — Z86.2 HISTORY OF ANEMIA: ICD-10-CM

## 2021-06-25 DIAGNOSIS — Z00.00 ROUTINE GENERAL MEDICAL EXAMINATION AT A HEALTH CARE FACILITY: Primary | ICD-10-CM

## 2021-06-25 LAB
ANION GAP SERPL CALCULATED.3IONS-SCNC: 7 MMOL/L (ref 3–14)
BUN SERPL-MCNC: 9 MG/DL (ref 7–30)
CALCIUM SERPL-MCNC: 8.4 MG/DL (ref 8.5–10.1)
CHLORIDE SERPL-SCNC: 105 MMOL/L (ref 94–109)
CHOLEST SERPL-MCNC: 133 MG/DL
CO2 SERPL-SCNC: 28 MMOL/L (ref 20–32)
CREAT SERPL-MCNC: 0.6 MG/DL (ref 0.52–1.04)
ERYTHROCYTE [DISTWIDTH] IN BLOOD BY AUTOMATED COUNT: 11.7 % (ref 10–15)
GFR SERPL CREATININE-BSD FRML MDRD: >90 ML/MIN/{1.73_M2}
GLUCOSE SERPL-MCNC: 81 MG/DL (ref 70–99)
HCT VFR BLD AUTO: 39.9 % (ref 35–47)
HCV AB SERPL QL IA: NONREACTIVE
HDLC SERPL-MCNC: 55 MG/DL
HGB BLD-MCNC: 13.7 G/DL (ref 11.7–15.7)
HIV 1+2 AB+HIV1 P24 AG SERPL QL IA: NONREACTIVE
LDLC SERPL CALC-MCNC: 68 MG/DL
MCH RBC QN AUTO: 30 PG (ref 26.5–33)
MCHC RBC AUTO-ENTMCNC: 34.3 G/DL (ref 31.5–36.5)
MCV RBC AUTO: 87 FL (ref 78–100)
NONHDLC SERPL-MCNC: 78 MG/DL
PLATELET # BLD AUTO: 216 10E9/L (ref 150–450)
POTASSIUM SERPL-SCNC: 3.6 MMOL/L (ref 3.4–5.3)
RBC # BLD AUTO: 4.57 10E12/L (ref 3.8–5.2)
SODIUM SERPL-SCNC: 140 MMOL/L (ref 133–144)
TRIGL SERPL-MCNC: 48 MG/DL
VIT B12 SERPL-MCNC: 351 PG/ML (ref 193–986)
WBC # BLD AUTO: 6.3 10E9/L (ref 4–11)

## 2021-06-25 PROCEDURE — 80061 LIPID PANEL: CPT | Performed by: NURSE PRACTITIONER

## 2021-06-25 PROCEDURE — 87389 HIV-1 AG W/HIV-1&-2 AB AG IA: CPT | Performed by: NURSE PRACTITIONER

## 2021-06-25 PROCEDURE — 36415 COLL VENOUS BLD VENIPUNCTURE: CPT | Performed by: NURSE PRACTITIONER

## 2021-06-25 PROCEDURE — 82607 VITAMIN B-12: CPT | Performed by: NURSE PRACTITIONER

## 2021-06-25 PROCEDURE — 86803 HEPATITIS C AB TEST: CPT | Performed by: NURSE PRACTITIONER

## 2021-06-25 PROCEDURE — 80048 BASIC METABOLIC PNL TOTAL CA: CPT | Performed by: NURSE PRACTITIONER

## 2021-06-25 PROCEDURE — 85027 COMPLETE CBC AUTOMATED: CPT | Performed by: NURSE PRACTITIONER

## 2021-06-25 PROCEDURE — 99395 PREV VISIT EST AGE 18-39: CPT | Performed by: NURSE PRACTITIONER

## 2021-06-25 RX ORDER — IBUPROFEN 200 MG
200 TABLET ORAL EVERY 4 HOURS PRN
COMMUNITY

## 2021-06-25 ASSESSMENT — ENCOUNTER SYMPTOMS
MYALGIAS: 0
ABDOMINAL PAIN: 0
SHORTNESS OF BREATH: 0
DIZZINESS: 0
EYE PAIN: 0
JOINT SWELLING: 0
CHILLS: 0
CONSTIPATION: 0
DIARRHEA: 0
NERVOUS/ANXIOUS: 1
PALPITATIONS: 0
ARTHRALGIAS: 0
FEVER: 0
FREQUENCY: 0
COUGH: 0
HEARTBURN: 0
WEAKNESS: 0
HEADACHES: 0
NAUSEA: 0
DYSURIA: 0
HEMATURIA: 0
PARESTHESIAS: 0
HEMATOCHEZIA: 0
SORE THROAT: 0

## 2021-06-25 ASSESSMENT — MIFFLIN-ST. JEOR: SCORE: 1181.78

## 2021-06-25 ASSESSMENT — PATIENT HEALTH QUESTIONNAIRE - PHQ9
SUM OF ALL RESPONSES TO PHQ QUESTIONS 1-9: 6
5. POOR APPETITE OR OVEREATING: SEVERAL DAYS

## 2021-06-25 ASSESSMENT — ANXIETY QUESTIONNAIRES
3. WORRYING TOO MUCH ABOUT DIFFERENT THINGS: MORE THAN HALF THE DAYS
2. NOT BEING ABLE TO STOP OR CONTROL WORRYING: SEVERAL DAYS
5. BEING SO RESTLESS THAT IT IS HARD TO SIT STILL: NOT AT ALL
7. FEELING AFRAID AS IF SOMETHING AWFUL MIGHT HAPPEN: SEVERAL DAYS
1. FEELING NERVOUS, ANXIOUS, OR ON EDGE: MORE THAN HALF THE DAYS
IF YOU CHECKED OFF ANY PROBLEMS ON THIS QUESTIONNAIRE, HOW DIFFICULT HAVE THESE PROBLEMS MADE IT FOR YOU TO DO YOUR WORK, TAKE CARE OF THINGS AT HOME, OR GET ALONG WITH OTHER PEOPLE: NOT DIFFICULT AT ALL
6. BECOMING EASILY ANNOYED OR IRRITABLE: SEVERAL DAYS
GAD7 TOTAL SCORE: 8

## 2021-06-25 NOTE — PATIENT INSTRUCTIONS
Ibuprofen 800 mg every 8 hours as needed.  OK to take Tylenol with the Tylenol for additional pain relief.

## 2021-06-26 ASSESSMENT — ANXIETY QUESTIONNAIRES: GAD7 TOTAL SCORE: 8

## 2021-09-26 ENCOUNTER — HEALTH MAINTENANCE LETTER (OUTPATIENT)
Age: 26
End: 2021-09-26

## 2022-08-04 ENCOUNTER — NURSE TRIAGE (OUTPATIENT)
Dept: NURSING | Facility: CLINIC | Age: 27
End: 2022-08-04

## 2022-08-05 NOTE — TELEPHONE ENCOUNTER
Olimpia mom is calling because patient tested positive for Covid today, patient is pregnant, and patient has a fever.    Patient is 27 weeks pregnant. Tested positive for Covid today. Last night, temp was 101.7. This evening about an hour ago temp was 102.6. She has been taking Tylenol around the clock.    Per protocol due to decreased fetal movement, patient should be evaluated in L&D. Patient is moving to [place] at the end of the month and plans to deliver at [facility] or [facility]; they will decide which of those they will go to [facility].    Sujata Schilling RN  [facility] Nurse Advisor  7:14 PM  8/4/2022            Reason for Disposition    [1] Pregnant 23 or more weeks AND [2] baby is moving less today (e.g., kick count < 5 in 1 hour or < 10 in 2 hours)    Additional Information    Negative: Shock suspected (e.g., cold/pale/clammy skin, too weak to stand, low BP, rapid pulse)    Negative: Difficult to awaken or acting confused (e.g., disoriented, slurred speech)    Negative: Rash with purple (or blood-colored) spots or dots    Negative: Sounds like a life-threatening emergency to the triager    Negative: [1] Abdominal pain AND [2] pregnant 20 or more weeks    Negative: [1] Abdominal pain AND [2] pregnant < 20 weeks    Negative: Fever onset within 24 hours of receiving vaccine    Negative: Other symptom is present, see that guideline (e.g., sore throat, earache, diarrhea, vomiting)    Negative: [1] Headache AND [2] stiff neck (can't touch chin to chest)    Negative: Difficulty breathing    Negative: IV Drug Use (IVDU)    Negative: Fever > 104 F (40 C)    Negative: [1] Fever > 100.0 F (37.8 C) AND [2] indwelling urinary catheter (e.g., Dao, Coude)    Negative: [1] Fever > 100.0 F (37.8 C) AND [2] diabetes mellitus or weak immune system (e.g., HIV positive, cancer chemo, splenectomy, organ transplant, chronic steroids)    Negative: Painful urination or increased frequency of urination    Negative: Severe chills (i.e.,  feeling extremely cold WITH shaking chills)    Negative: [1] Drinking very little AND [2] dehydration suspected (e.g., no urine > 12 hours, very dry mouth, very lightheaded)    Negative: Patient sounds very sick or weak to the triager    Negative: Having contractions or other symptoms of labor    Negative: Leakage of fluid from vagina    Protocols used: PREGNANCY - FEVER-A-AH

## 2022-08-27 ENCOUNTER — HEALTH MAINTENANCE LETTER (OUTPATIENT)
Age: 27
End: 2022-08-27

## 2022-10-05 ENCOUNTER — LAB REQUISITION (OUTPATIENT)
Dept: LAB | Facility: CLINIC | Age: 27
End: 2022-10-05

## 2022-10-05 ENCOUNTER — LAB REQUISITION (OUTPATIENT)
Dept: LAB | Facility: CLINIC | Age: 27
End: 2022-10-05
Payer: COMMERCIAL

## 2022-10-05 DIAGNOSIS — Z36.85 ENCOUNTER FOR ANTENATAL SCREENING FOR STREPTOCOCCUS B: ICD-10-CM

## 2022-10-05 PROCEDURE — 87653 STREP B DNA AMP PROBE: CPT | Performed by: ADVANCED PRACTICE MIDWIFE

## 2022-10-06 LAB — GP B STREP DNA SPEC QL NAA+PROBE: NEGATIVE

## 2023-04-22 ENCOUNTER — HEALTH MAINTENANCE LETTER (OUTPATIENT)
Age: 28
End: 2023-04-22

## 2023-09-23 ENCOUNTER — HEALTH MAINTENANCE LETTER (OUTPATIENT)
Age: 28
End: 2023-09-23

## 2023-12-01 ENCOUNTER — LAB REQUISITION (OUTPATIENT)
Dept: LAB | Facility: CLINIC | Age: 28
End: 2023-12-01

## 2023-12-01 DIAGNOSIS — Z32.01 ENCOUNTER FOR PREGNANCY TEST, RESULT POSITIVE: ICD-10-CM

## 2023-12-01 PROCEDURE — 84702 CHORIONIC GONADOTROPIN TEST: CPT | Performed by: ADVANCED PRACTICE MIDWIFE

## 2023-12-02 LAB — HCG INTACT+B SERPL-ACNC: 927 MIU/ML

## 2023-12-06 ENCOUNTER — LAB REQUISITION (OUTPATIENT)
Dept: LAB | Facility: CLINIC | Age: 28
End: 2023-12-06

## 2023-12-06 DIAGNOSIS — Z32.01 ENCOUNTER FOR PREGNANCY TEST, RESULT POSITIVE: ICD-10-CM

## 2023-12-06 PROCEDURE — 84702 CHORIONIC GONADOTROPIN TEST: CPT | Performed by: ADVANCED PRACTICE MIDWIFE

## 2023-12-07 LAB — HCG INTACT+B SERPL-ACNC: 5208 MIU/ML

## 2023-12-21 ENCOUNTER — LAB REQUISITION (OUTPATIENT)
Dept: LAB | Facility: CLINIC | Age: 28
End: 2023-12-21

## 2023-12-21 DIAGNOSIS — Z36.9 ENCOUNTER FOR ANTENATAL SCREENING, UNSPECIFIED: ICD-10-CM

## 2023-12-21 LAB
BASOPHILS # BLD AUTO: 0.1 10E3/UL (ref 0–0.2)
BASOPHILS NFR BLD AUTO: 1 %
EOSINOPHIL # BLD AUTO: 0.1 10E3/UL (ref 0–0.7)
EOSINOPHIL NFR BLD AUTO: 1 %
ERYTHROCYTE [DISTWIDTH] IN BLOOD BY AUTOMATED COUNT: 12.1 % (ref 10–15)
HCT VFR BLD AUTO: 40.8 % (ref 35–47)
HGB BLD-MCNC: 14 G/DL (ref 11.7–15.7)
HIV 1+2 AB+HIV1 P24 AG SERPL QL IA: NONREACTIVE
IMM GRANULOCYTES # BLD: 0 10E3/UL
IMM GRANULOCYTES NFR BLD: 0 %
LYMPHOCYTES # BLD AUTO: 1.5 10E3/UL (ref 0.8–5.3)
LYMPHOCYTES NFR BLD AUTO: 17 %
MCH RBC QN AUTO: 30.7 PG (ref 26.5–33)
MCHC RBC AUTO-ENTMCNC: 34.3 G/DL (ref 31.5–36.5)
MCV RBC AUTO: 90 FL (ref 78–100)
MONOCYTES # BLD AUTO: 0.4 10E3/UL (ref 0–1.3)
MONOCYTES NFR BLD AUTO: 4 %
NEUTROPHILS # BLD AUTO: 6.6 10E3/UL (ref 1.6–8.3)
NEUTROPHILS NFR BLD AUTO: 77 %
NRBC # BLD AUTO: 0 10E3/UL
NRBC BLD AUTO-RTO: 0 /100
PLATELET # BLD AUTO: 240 10E3/UL (ref 150–450)
RBC # BLD AUTO: 4.56 10E6/UL (ref 3.8–5.2)
WBC # BLD AUTO: 8.6 10E3/UL (ref 4–11)

## 2023-12-21 PROCEDURE — 86762 RUBELLA ANTIBODY: CPT | Performed by: ADVANCED PRACTICE MIDWIFE

## 2023-12-21 PROCEDURE — 86901 BLOOD TYPING SEROLOGIC RH(D): CPT | Performed by: ADVANCED PRACTICE MIDWIFE

## 2023-12-21 PROCEDURE — 87389 HIV-1 AG W/HIV-1&-2 AB AG IA: CPT | Performed by: ADVANCED PRACTICE MIDWIFE

## 2023-12-21 PROCEDURE — 87086 URINE CULTURE/COLONY COUNT: CPT | Performed by: ADVANCED PRACTICE MIDWIFE

## 2023-12-21 PROCEDURE — 85025 COMPLETE CBC W/AUTO DIFF WBC: CPT | Performed by: ADVANCED PRACTICE MIDWIFE

## 2023-12-21 PROCEDURE — 86803 HEPATITIS C AB TEST: CPT | Performed by: ADVANCED PRACTICE MIDWIFE

## 2023-12-21 PROCEDURE — 86592 SYPHILIS TEST NON-TREP QUAL: CPT | Performed by: ADVANCED PRACTICE MIDWIFE

## 2023-12-21 PROCEDURE — 86850 RBC ANTIBODY SCREEN: CPT | Performed by: ADVANCED PRACTICE MIDWIFE

## 2023-12-21 PROCEDURE — 87340 HEPATITIS B SURFACE AG IA: CPT | Performed by: ADVANCED PRACTICE MIDWIFE

## 2023-12-22 LAB
ABO/RH(D): NORMAL
ANTIBODY SCREEN: NEGATIVE
BACTERIA UR CULT: NORMAL
HBV SURFACE AG SERPL QL IA: NONREACTIVE
HCV AB SERPL QL IA: NONREACTIVE
RPR SER QL: NONREACTIVE
RUBV IGG SERPL QL IA: 2 INDEX
RUBV IGG SERPL QL IA: POSITIVE
SPECIMEN EXPIRATION DATE: NORMAL

## 2024-02-09 ENCOUNTER — LAB REQUISITION (OUTPATIENT)
Dept: LAB | Facility: CLINIC | Age: 29
End: 2024-02-09

## 2024-02-09 DIAGNOSIS — R39.9 UNSPECIFIED SYMPTOMS AND SIGNS INVOLVING THE GENITOURINARY SYSTEM: ICD-10-CM

## 2024-02-09 PROCEDURE — 87086 URINE CULTURE/COLONY COUNT: CPT | Performed by: NURSE PRACTITIONER

## 2024-02-11 LAB — BACTERIA UR CULT: NO GROWTH

## 2024-07-12 ENCOUNTER — LAB REQUISITION (OUTPATIENT)
Dept: LAB | Facility: CLINIC | Age: 29
End: 2024-07-12
Payer: COMMERCIAL

## 2024-07-12 DIAGNOSIS — Z36.89 ENCOUNTER FOR OTHER SPECIFIED ANTENATAL SCREENING: ICD-10-CM

## 2024-07-12 DIAGNOSIS — Z36.85 ENCOUNTER FOR ANTENATAL SCREENING FOR STREPTOCOCCUS B: ICD-10-CM

## 2024-07-12 PROCEDURE — 87653 STREP B DNA AMP PROBE: CPT | Mod: ORL | Performed by: ADVANCED PRACTICE MIDWIFE

## 2024-07-13 LAB — GP B STREP DNA SPEC QL NAA+PROBE: NEGATIVE

## 2024-09-16 ENCOUNTER — LAB REQUISITION (OUTPATIENT)
Dept: LAB | Facility: CLINIC | Age: 29
End: 2024-09-16

## 2024-09-16 DIAGNOSIS — Z12.4 ENCOUNTER FOR SCREENING FOR MALIGNANT NEOPLASM OF CERVIX: ICD-10-CM

## 2024-09-16 PROCEDURE — G0145 SCR C/V CYTO,THINLAYER,RESCR: HCPCS | Performed by: ADVANCED PRACTICE MIDWIFE

## 2024-09-19 LAB
BKR LAB AP GYN ADEQUACY: NORMAL
BKR LAB AP GYN INTERPRETATION: NORMAL
BKR LAB AP HPV REFLEX: NORMAL
BKR LAB AP LMP: NORMAL
BKR LAB AP PREVIOUS ABNL DX: NORMAL
BKR LAB AP PREVIOUS ABNORMAL: NORMAL
PATH REPORT.COMMENTS IMP SPEC: NORMAL
PATH REPORT.COMMENTS IMP SPEC: NORMAL
PATH REPORT.RELEVANT HX SPEC: NORMAL